# Patient Record
Sex: FEMALE | Race: WHITE | Employment: OTHER | URBAN - METROPOLITAN AREA
[De-identification: names, ages, dates, MRNs, and addresses within clinical notes are randomized per-mention and may not be internally consistent; named-entity substitution may affect disease eponyms.]

---

## 2017-02-17 ENCOUNTER — OFFICE VISIT (OUTPATIENT)
Dept: FAMILY MEDICINE CLINIC | Age: 27
End: 2017-02-17

## 2017-02-17 VITALS
DIASTOLIC BLOOD PRESSURE: 80 MMHG | SYSTOLIC BLOOD PRESSURE: 102 MMHG | HEART RATE: 70 BPM | WEIGHT: 147 LBS | OXYGEN SATURATION: 97 % | HEIGHT: 68 IN | BODY MASS INDEX: 22.28 KG/M2 | TEMPERATURE: 97.8 F

## 2017-02-17 DIAGNOSIS — R06.2 WHEEZING: Primary | ICD-10-CM

## 2017-02-17 DIAGNOSIS — J45.21 MILD INTERMITTENT ASTHMA WITH ACUTE EXACERBATION: ICD-10-CM

## 2017-02-17 PROCEDURE — 99214 OFFICE O/P EST MOD 30 MIN: CPT | Performed by: FAMILY MEDICINE

## 2017-02-17 PROCEDURE — 94640 AIRWAY INHALATION TREATMENT: CPT | Performed by: FAMILY MEDICINE

## 2017-02-17 RX ORDER — METHYLPREDNISOLONE 4 MG/1
TABLET ORAL
Qty: 1 KIT | Refills: 0 | Status: SHIPPED | OUTPATIENT
Start: 2017-02-17 | End: 2017-02-23

## 2017-02-17 RX ORDER — LEVOFLOXACIN 500 MG/1
500 TABLET, FILM COATED ORAL DAILY
Qty: 7 TABLET | Refills: 0 | Status: SHIPPED | OUTPATIENT
Start: 2017-02-17 | End: 2017-02-24

## 2017-02-17 RX ORDER — ALBUTEROL SULFATE 2.5 MG/3ML
2.5 SOLUTION RESPIRATORY (INHALATION) ONCE
Status: COMPLETED | OUTPATIENT
Start: 2017-02-17 | End: 2017-02-17

## 2017-02-17 RX ORDER — ALBUTEROL SULFATE 90 UG/1
2-4 AEROSOL, METERED RESPIRATORY (INHALATION) EVERY 6 HOURS PRN
Qty: 1 INHALER | Refills: 3 | Status: SHIPPED | OUTPATIENT
Start: 2017-02-17

## 2017-02-17 RX ADMIN — ALBUTEROL SULFATE 2.5 MG: 2.5 SOLUTION RESPIRATORY (INHALATION) at 10:55

## 2017-02-20 ASSESSMENT — ENCOUNTER SYMPTOMS
SORE THROAT: 0
WHEEZING: 1
COUGH: 1
HEARTBURN: 0
TROUBLE SWALLOWING: 0
RHINORRHEA: 1

## 2017-02-21 ENCOUNTER — TELEPHONE (OUTPATIENT)
Dept: FAMILY MEDICINE CLINIC | Age: 27
End: 2017-02-21

## 2017-04-10 ENCOUNTER — TELEPHONE (OUTPATIENT)
Dept: FAMILY MEDICINE CLINIC | Age: 27
End: 2017-04-10

## 2017-04-11 ENCOUNTER — OFFICE VISIT (OUTPATIENT)
Dept: FAMILY MEDICINE CLINIC | Age: 27
End: 2017-04-11

## 2017-04-11 VITALS
DIASTOLIC BLOOD PRESSURE: 58 MMHG | WEIGHT: 149 LBS | BODY MASS INDEX: 22.58 KG/M2 | HEART RATE: 78 BPM | OXYGEN SATURATION: 98 % | TEMPERATURE: 98 F | HEIGHT: 68 IN | SYSTOLIC BLOOD PRESSURE: 110 MMHG

## 2017-04-11 DIAGNOSIS — L70.0 ACNE VULGARIS: Primary | ICD-10-CM

## 2017-04-11 PROCEDURE — 99213 OFFICE O/P EST LOW 20 MIN: CPT | Performed by: FAMILY MEDICINE

## 2017-04-11 RX ORDER — TRETINOIN 0.5 MG/G
CREAM TOPICAL
Qty: 90 G | Refills: 2 | Status: SHIPPED | OUTPATIENT
Start: 2017-04-11 | End: 2017-05-11

## 2017-04-11 RX ORDER — CLINDAMYCIN PHOSPHATE AND BENZOYL PEROXIDE 10; 50 MG/G; MG/G
GEL TOPICAL
Qty: 90 G | Refills: 0 | Status: SHIPPED | OUTPATIENT
Start: 2017-04-11

## 2017-04-11 ASSESSMENT — ENCOUNTER SYMPTOMS: ROS SKIN COMMENTS: ACNE

## 2017-05-25 ENCOUNTER — TELEPHONE (OUTPATIENT)
Dept: FAMILY MEDICINE CLINIC | Age: 27
End: 2017-05-25

## 2017-05-25 DIAGNOSIS — L70.0 ACNE VULGARIS: ICD-10-CM

## 2017-05-25 RX ORDER — CLINDAMYCIN PHOSPHATE 10 MG/G
GEL TOPICAL
Qty: 60 G | Refills: 1 | Status: CANCELLED | OUTPATIENT
Start: 2017-05-25 | End: 2017-06-01

## 2017-06-01 RX ORDER — CLINDAMYCIN PHOSPHATE 10 MG/G
GEL TOPICAL
Qty: 60 G | Refills: 1 | Status: SHIPPED | OUTPATIENT
Start: 2017-06-01 | End: 2017-06-08

## 2017-08-15 ENCOUNTER — TELEPHONE (OUTPATIENT)
Dept: FAMILY MEDICINE CLINIC | Age: 27
End: 2017-08-15

## 2017-09-27 ENCOUNTER — TELEPHONE (OUTPATIENT)
Dept: FAMILY MEDICINE CLINIC | Age: 27
End: 2017-09-27

## 2017-09-27 NOTE — TELEPHONE ENCOUNTER
Patient called this morning and scheduled a same day appointment for a UTI. Patient just called back and cancelled stating that she is going to the [address] [place] [address]. Patient will be no showed.

## 2018-11-07 ENCOUNTER — OFFICE VISIT (OUTPATIENT)
Dept: ORTHOPEDIC SURGERY | Age: 28
End: 2018-11-07
Payer: COMMERCIAL

## 2018-11-07 VITALS — BODY MASS INDEX: 22.07 KG/M2 | HEIGHT: 69 IN | WEIGHT: 149 LBS

## 2018-11-07 DIAGNOSIS — M25.562 LEFT KNEE PAIN, UNSPECIFIED CHRONICITY: Primary | ICD-10-CM

## 2018-11-07 DIAGNOSIS — S83.232A COMPLEX TEAR OF MEDIAL MENISCUS OF LEFT KNEE AS CURRENT INJURY, INITIAL ENCOUNTER: ICD-10-CM

## 2018-11-07 PROCEDURE — 99203 OFFICE O/P NEW LOW 30 MIN: CPT | Performed by: ORTHOPAEDIC SURGERY

## 2018-11-21 ENCOUNTER — OFFICE VISIT (OUTPATIENT)
Dept: ORTHOPEDIC SURGERY | Age: 28
End: 2018-11-21
Payer: COMMERCIAL

## 2018-11-21 VITALS — HEIGHT: 69 IN | WEIGHT: 149 LBS | BODY MASS INDEX: 22.07 KG/M2

## 2018-11-21 DIAGNOSIS — S83.232A COMPLEX TEAR OF MEDIAL MENISCUS OF LEFT KNEE AS CURRENT INJURY, INITIAL ENCOUNTER: Primary | ICD-10-CM

## 2018-11-21 PROCEDURE — 99214 OFFICE O/P EST MOD 30 MIN: CPT | Performed by: ORTHOPAEDIC SURGERY

## 2018-11-26 DIAGNOSIS — S83.232A COMPLEX TEAR OF MEDIAL MENISCUS OF LEFT KNEE AS CURRENT INJURY, INITIAL ENCOUNTER: Primary | ICD-10-CM

## 2018-11-29 ENCOUNTER — TELEPHONE (OUTPATIENT)
Dept: ORTHOPEDIC SURGERY | Age: 28
End: 2018-11-29

## 2018-12-05 RX ORDER — TRETINOIN 0.25 MG/G
GEL TOPICAL NIGHTLY
COMMUNITY

## 2018-12-14 ENCOUNTER — ANESTHESIA EVENT (OUTPATIENT)
Dept: OPERATING ROOM | Age: 28
End: 2018-12-14
Payer: COMMERCIAL

## 2018-12-14 ENCOUNTER — ANESTHESIA (OUTPATIENT)
Dept: OPERATING ROOM | Age: 28
End: 2018-12-14
Payer: COMMERCIAL

## 2018-12-14 ENCOUNTER — HOSPITAL ENCOUNTER (OUTPATIENT)
Age: 28
Setting detail: OUTPATIENT SURGERY
Discharge: HOME | End: 2018-12-14
Attending: ORTHOPAEDIC SURGERY | Admitting: ORTHOPAEDIC SURGERY
Payer: COMMERCIAL

## 2018-12-14 VITALS
TEMPERATURE: 98 F | WEIGHT: 150 LBS | OXYGEN SATURATION: 98 % | RESPIRATION RATE: 17 BRPM | DIASTOLIC BLOOD PRESSURE: 69 MMHG | SYSTOLIC BLOOD PRESSURE: 109 MMHG | HEIGHT: 68 IN | HEART RATE: 70 BPM | BODY MASS INDEX: 22.73 KG/M2

## 2018-12-14 VITALS
RESPIRATION RATE: 13 BRPM | OXYGEN SATURATION: 100 % | DIASTOLIC BLOOD PRESSURE: 51 MMHG | SYSTOLIC BLOOD PRESSURE: 97 MMHG

## 2018-12-14 DIAGNOSIS — S83.239A COMPLEX TEAR OF MEDIAL MENISCUS OF KNEE AS CURRENT INJURY, UNSPECIFIED LATERALITY, INITIAL ENCOUNTER: Primary | ICD-10-CM

## 2018-12-14 DIAGNOSIS — Z98.890 S/P ARTHROSCOPY OF KNEE: ICD-10-CM

## 2018-12-14 LAB — PREGNANCY, URINE: NEGATIVE

## 2018-12-14 PROCEDURE — 6360000002 HC RX W HCPCS: Performed by: ANESTHESIOLOGY

## 2018-12-14 PROCEDURE — 7100000010 HC PHASE II RECOVERY - FIRST 15 MIN: Performed by: ORTHOPAEDIC SURGERY

## 2018-12-14 PROCEDURE — 6370000000 HC RX 637 (ALT 250 FOR IP)

## 2018-12-14 PROCEDURE — 6360000002 HC RX W HCPCS

## 2018-12-14 PROCEDURE — 84703 CHORIONIC GONADOTROPIN ASSAY: CPT

## 2018-12-14 PROCEDURE — 2580000003 HC RX 258

## 2018-12-14 PROCEDURE — 7100000000 HC PACU RECOVERY - FIRST 15 MIN: Performed by: ORTHOPAEDIC SURGERY

## 2018-12-14 PROCEDURE — 2500000003 HC RX 250 WO HCPCS: Performed by: ORTHOPAEDIC SURGERY

## 2018-12-14 PROCEDURE — 2500000003 HC RX 250 WO HCPCS

## 2018-12-14 PROCEDURE — 2500000003 HC RX 250 WO HCPCS: Performed by: ANESTHESIOLOGY

## 2018-12-14 PROCEDURE — 2580000003 HC RX 258: Performed by: ANESTHESIOLOGY

## 2018-12-14 PROCEDURE — 7100000001 HC PACU RECOVERY - ADDTL 15 MIN: Performed by: ORTHOPAEDIC SURGERY

## 2018-12-14 PROCEDURE — 7100000011 HC PHASE II RECOVERY - ADDTL 15 MIN: Performed by: ORTHOPAEDIC SURGERY

## 2018-12-14 PROCEDURE — 3600000004 HC SURGERY LEVEL 4 BASE: Performed by: ORTHOPAEDIC SURGERY

## 2018-12-14 PROCEDURE — 2709999900 HC NON-CHARGEABLE SUPPLY: Performed by: ORTHOPAEDIC SURGERY

## 2018-12-14 PROCEDURE — 3700000000 HC ANESTHESIA ATTENDED CARE: Performed by: ORTHOPAEDIC SURGERY

## 2018-12-14 PROCEDURE — 3600000014 HC SURGERY LEVEL 4 ADDTL 15MIN: Performed by: ORTHOPAEDIC SURGERY

## 2018-12-14 PROCEDURE — C1713 ANCHOR/SCREW BN/BN,TIS/BN: HCPCS | Performed by: ORTHOPAEDIC SURGERY

## 2018-12-14 PROCEDURE — 3700000001 HC ADD 15 MINUTES (ANESTHESIA): Performed by: ORTHOPAEDIC SURGERY

## 2018-12-14 PROCEDURE — 2720000010 HC SURG SUPPLY STERILE: Performed by: ORTHOPAEDIC SURGERY

## 2018-12-14 DEVICE — FAST-FIX 360 CURVED MENISCAL                                    REPAIR SYSTEM
Type: IMPLANTABLE DEVICE | Status: FUNCTIONAL
Brand: FAST-FIX

## 2018-12-14 RX ORDER — SCOLOPAMINE TRANSDERMAL SYSTEM 1 MG/1
PATCH, EXTENDED RELEASE TRANSDERMAL
Status: DISCONTINUED
Start: 2018-12-14 | End: 2018-12-14 | Stop reason: HOSPADM

## 2018-12-14 RX ORDER — ONDANSETRON 2 MG/ML
4 INJECTION INTRAMUSCULAR; INTRAVENOUS ONCE
Status: COMPLETED | OUTPATIENT
Start: 2018-12-14 | End: 2018-12-14

## 2018-12-14 RX ORDER — SCOLOPAMINE TRANSDERMAL SYSTEM 1 MG/1
1 PATCH, EXTENDED RELEASE TRANSDERMAL
Status: DISCONTINUED | OUTPATIENT
Start: 2018-12-14 | End: 2018-12-14 | Stop reason: HOSPADM

## 2018-12-14 RX ORDER — OXYCODONE HYDROCHLORIDE AND ACETAMINOPHEN 5; 325 MG/1; MG/1
1 TABLET ORAL EVERY 6 HOURS PRN
Qty: 28 TABLET | Refills: 0 | Status: SHIPPED | OUTPATIENT
Start: 2018-12-14 | End: 2018-12-21

## 2018-12-14 RX ORDER — KETOROLAC TROMETHAMINE 30 MG/ML
30 INJECTION, SOLUTION INTRAMUSCULAR; INTRAVENOUS ONCE
Status: COMPLETED | OUTPATIENT
Start: 2018-12-14 | End: 2018-12-14

## 2018-12-14 RX ORDER — OXYCODONE HYDROCHLORIDE AND ACETAMINOPHEN 5; 325 MG/1; MG/1
1 TABLET ORAL PRN
Status: DISCONTINUED | OUTPATIENT
Start: 2018-12-14 | End: 2018-12-14 | Stop reason: HOSPADM

## 2018-12-14 RX ORDER — CEFAZOLIN SODIUM 2 G/50ML
2 SOLUTION INTRAVENOUS ONCE
Status: COMPLETED | OUTPATIENT
Start: 2018-12-14 | End: 2018-12-14

## 2018-12-14 RX ORDER — BUPIVACAINE HYDROCHLORIDE 2.5 MG/ML
INJECTION, SOLUTION INFILTRATION; PERINEURAL PRN
Status: DISCONTINUED | OUTPATIENT
Start: 2018-12-14 | End: 2018-12-14 | Stop reason: HOSPADM

## 2018-12-14 RX ORDER — DEXAMETHASONE SODIUM PHOSPHATE 4 MG/ML
INJECTION, SOLUTION INTRA-ARTICULAR; INTRALESIONAL; INTRAMUSCULAR; INTRAVENOUS; SOFT TISSUE PRN
Status: DISCONTINUED | OUTPATIENT
Start: 2018-12-14 | End: 2018-12-14 | Stop reason: SDUPTHER

## 2018-12-14 RX ORDER — HYDRALAZINE HYDROCHLORIDE 20 MG/ML
5 INJECTION INTRAMUSCULAR; INTRAVENOUS
Status: DISCONTINUED | OUTPATIENT
Start: 2018-12-14 | End: 2018-12-14 | Stop reason: HOSPADM

## 2018-12-14 RX ORDER — LIDOCAINE HYDROCHLORIDE 10 MG/ML
INJECTION, SOLUTION EPIDURAL; INFILTRATION; INTRACAUDAL; PERINEURAL
Status: COMPLETED
Start: 2018-12-14 | End: 2018-12-14

## 2018-12-14 RX ORDER — LABETALOL HYDROCHLORIDE 5 MG/ML
5 INJECTION, SOLUTION INTRAVENOUS EVERY 10 MIN PRN
Status: DISCONTINUED | OUTPATIENT
Start: 2018-12-14 | End: 2018-12-14 | Stop reason: HOSPADM

## 2018-12-14 RX ORDER — ONDANSETRON 2 MG/ML
4 INJECTION INTRAMUSCULAR; INTRAVENOUS
Status: COMPLETED | OUTPATIENT
Start: 2018-12-14 | End: 2018-12-14

## 2018-12-14 RX ORDER — MEPERIDINE HYDROCHLORIDE 25 MG/ML
12.5 INJECTION INTRAMUSCULAR; INTRAVENOUS; SUBCUTANEOUS EVERY 5 MIN PRN
Status: DISCONTINUED | OUTPATIENT
Start: 2018-12-14 | End: 2018-12-14 | Stop reason: HOSPADM

## 2018-12-14 RX ORDER — BUPIVACAINE HYDROCHLORIDE 2.5 MG/ML
INJECTION, SOLUTION EPIDURAL; INFILTRATION; INTRACAUDAL
Status: DISCONTINUED
Start: 2018-12-14 | End: 2018-12-14 | Stop reason: HOSPADM

## 2018-12-14 RX ORDER — FENTANYL CITRATE 50 UG/ML
INJECTION, SOLUTION INTRAMUSCULAR; INTRAVENOUS PRN
Status: DISCONTINUED | OUTPATIENT
Start: 2018-12-14 | End: 2018-12-14 | Stop reason: SDUPTHER

## 2018-12-14 RX ORDER — MORPHINE SULFATE 10 MG/ML
2 INJECTION, SOLUTION INTRAMUSCULAR; INTRAVENOUS EVERY 5 MIN PRN
Status: DISCONTINUED | OUTPATIENT
Start: 2018-12-14 | End: 2018-12-14 | Stop reason: HOSPADM

## 2018-12-14 RX ORDER — CEFAZOLIN SODIUM 2 G/50ML
SOLUTION INTRAVENOUS
Status: COMPLETED
Start: 2018-12-14 | End: 2018-12-14

## 2018-12-14 RX ORDER — METHYLPREDNISOLONE ACETATE 80 MG/ML
INJECTION, SUSPENSION INTRA-ARTICULAR; INTRALESIONAL; INTRAMUSCULAR; SOFT TISSUE
Status: DISCONTINUED
Start: 2018-12-14 | End: 2018-12-14 | Stop reason: WASHOUT

## 2018-12-14 RX ORDER — MORPHINE SULFATE 10 MG/ML
1 INJECTION, SOLUTION INTRAMUSCULAR; INTRAVENOUS EVERY 5 MIN PRN
Status: DISCONTINUED | OUTPATIENT
Start: 2018-12-14 | End: 2018-12-14 | Stop reason: HOSPADM

## 2018-12-14 RX ORDER — PROPOFOL 10 MG/ML
INJECTION, EMULSION INTRAVENOUS PRN
Status: DISCONTINUED | OUTPATIENT
Start: 2018-12-14 | End: 2018-12-14 | Stop reason: SDUPTHER

## 2018-12-14 RX ORDER — OXYCODONE HYDROCHLORIDE 5 MG/1
5 TABLET ORAL ONCE
Status: COMPLETED | OUTPATIENT
Start: 2018-12-14 | End: 2018-12-14

## 2018-12-14 RX ORDER — SODIUM CHLORIDE, SODIUM LACTATE, POTASSIUM CHLORIDE, CALCIUM CHLORIDE 600; 310; 30; 20 MG/100ML; MG/100ML; MG/100ML; MG/100ML
INJECTION, SOLUTION INTRAVENOUS
Status: COMPLETED
Start: 2018-12-14 | End: 2018-12-14

## 2018-12-14 RX ORDER — KETOROLAC TROMETHAMINE 30 MG/ML
INJECTION, SOLUTION INTRAMUSCULAR; INTRAVENOUS
Status: DISCONTINUED
Start: 2018-12-14 | End: 2018-12-14 | Stop reason: HOSPADM

## 2018-12-14 RX ORDER — OXYCODONE HYDROCHLORIDE AND ACETAMINOPHEN 5; 325 MG/1; MG/1
2 TABLET ORAL PRN
Status: DISCONTINUED | OUTPATIENT
Start: 2018-12-14 | End: 2018-12-14 | Stop reason: HOSPADM

## 2018-12-14 RX ORDER — SODIUM CHLORIDE, SODIUM LACTATE, POTASSIUM CHLORIDE, CALCIUM CHLORIDE 600; 310; 30; 20 MG/100ML; MG/100ML; MG/100ML; MG/100ML
INJECTION, SOLUTION INTRAVENOUS CONTINUOUS
Status: DISCONTINUED | OUTPATIENT
Start: 2018-12-14 | End: 2018-12-14 | Stop reason: HOSPADM

## 2018-12-14 RX ORDER — ACETAMINOPHEN 500 MG
1000 TABLET ORAL ONCE
Status: COMPLETED | OUTPATIENT
Start: 2018-12-14 | End: 2018-12-14

## 2018-12-14 RX ORDER — ONDANSETRON 2 MG/ML
INJECTION INTRAMUSCULAR; INTRAVENOUS
Status: COMPLETED
Start: 2018-12-14 | End: 2018-12-14

## 2018-12-14 RX ORDER — LIDOCAINE HYDROCHLORIDE 20 MG/ML
INJECTION, SOLUTION INFILTRATION; PERINEURAL PRN
Status: DISCONTINUED | OUTPATIENT
Start: 2018-12-14 | End: 2018-12-14 | Stop reason: SDUPTHER

## 2018-12-14 RX ORDER — OXYCODONE HYDROCHLORIDE 5 MG/1
TABLET ORAL
Status: COMPLETED
Start: 2018-12-14 | End: 2018-12-14

## 2018-12-14 RX ORDER — ACETAMINOPHEN 500 MG
TABLET ORAL
Status: COMPLETED
Start: 2018-12-14 | End: 2018-12-14

## 2018-12-14 RX ADMIN — CEFAZOLIN SODIUM 2 G: 2 SOLUTION INTRAVENOUS at 09:13

## 2018-12-14 RX ADMIN — SODIUM CHLORIDE, POTASSIUM CHLORIDE, SODIUM LACTATE AND CALCIUM CHLORIDE: 600; 310; 30; 20 INJECTION, SOLUTION INTRAVENOUS at 08:05

## 2018-12-14 RX ADMIN — SODIUM CHLORIDE, POTASSIUM CHLORIDE, SODIUM LACTATE AND CALCIUM CHLORIDE: 600; 310; 30; 20 INJECTION, SOLUTION INTRAVENOUS at 09:44

## 2018-12-14 RX ADMIN — LIDOCAINE HYDROCHLORIDE 20 MG: 20 INJECTION, SOLUTION INFILTRATION; PERINEURAL at 09:20

## 2018-12-14 RX ADMIN — KETOROLAC TROMETHAMINE 30 MG: 30 INJECTION, SOLUTION INTRAMUSCULAR; INTRAVENOUS at 10:50

## 2018-12-14 RX ADMIN — PROPOFOL 200 MG: 10 INJECTION, EMULSION INTRAVENOUS at 09:20

## 2018-12-14 RX ADMIN — FENTANYL CITRATE 25 MCG: 50 INJECTION INTRAMUSCULAR; INTRAVENOUS at 09:43

## 2018-12-14 RX ADMIN — OXYCODONE HYDROCHLORIDE 5 MG: 5 TABLET ORAL at 11:21

## 2018-12-14 RX ADMIN — ONDANSETRON 4 MG: 2 INJECTION, SOLUTION INTRAMUSCULAR; INTRAVENOUS at 09:41

## 2018-12-14 RX ADMIN — HYDROMORPHONE HYDROCHLORIDE 0.5 MG: 1 INJECTION, SOLUTION INTRAMUSCULAR; INTRAVENOUS; SUBCUTANEOUS at 10:52

## 2018-12-14 RX ADMIN — Medication 1000 MG: at 08:03

## 2018-12-14 RX ADMIN — FENTANYL CITRATE 50 MCG: 50 INJECTION INTRAMUSCULAR; INTRAVENOUS at 09:20

## 2018-12-14 RX ADMIN — ACETAMINOPHEN 1000 MG: 500 TABLET ORAL at 08:03

## 2018-12-14 RX ADMIN — ONDANSETRON 4 MG: 2 INJECTION INTRAMUSCULAR; INTRAVENOUS at 11:58

## 2018-12-14 RX ADMIN — LIDOCAINE HYDROCHLORIDE 0.1 ML: 10 INJECTION, SOLUTION EPIDURAL; INFILTRATION; INTRACAUDAL; PERINEURAL at 08:04

## 2018-12-14 RX ADMIN — DEXAMETHASONE SODIUM PHOSPHATE 10 MG: 4 INJECTION, SOLUTION INTRAMUSCULAR; INTRAVENOUS at 09:41

## 2018-12-14 RX ADMIN — FENTANYL CITRATE 25 MCG: 50 INJECTION INTRAMUSCULAR; INTRAVENOUS at 09:41

## 2018-12-14 RX ADMIN — HYDROMORPHONE HYDROCHLORIDE 0.5 MG: 1 INJECTION, SOLUTION INTRAMUSCULAR; INTRAVENOUS; SUBCUTANEOUS at 10:59

## 2018-12-14 RX ADMIN — HYDROMORPHONE HYDROCHLORIDE 0.5 MG: 1 INJECTION, SOLUTION INTRAMUSCULAR; INTRAVENOUS; SUBCUTANEOUS at 10:41

## 2018-12-14 ASSESSMENT — PULMONARY FUNCTION TESTS
PIF_VALUE: 2
PIF_VALUE: 11
PIF_VALUE: 11
PIF_VALUE: 10
PIF_VALUE: 3
PIF_VALUE: 10
PIF_VALUE: 1
PIF_VALUE: 2
PIF_VALUE: 10
PIF_VALUE: 11
PIF_VALUE: 10
PIF_VALUE: 2
PIF_VALUE: 11
PIF_VALUE: 0
PIF_VALUE: 2
PIF_VALUE: 11
PIF_VALUE: 2
PIF_VALUE: 11
PIF_VALUE: 10
PIF_VALUE: 2
PIF_VALUE: 0
PIF_VALUE: 2
PIF_VALUE: 1
PIF_VALUE: 2
PIF_VALUE: 10
PIF_VALUE: 2
PIF_VALUE: 2
PIF_VALUE: 11
PIF_VALUE: 2
PIF_VALUE: 17
PIF_VALUE: 2
PIF_VALUE: 1
PIF_VALUE: 2
PIF_VALUE: 10
PIF_VALUE: 2
PIF_VALUE: 1
PIF_VALUE: 11
PIF_VALUE: 11
PIF_VALUE: 2
PIF_VALUE: 2
PIF_VALUE: 10
PIF_VALUE: 2
PIF_VALUE: 11
PIF_VALUE: 20
PIF_VALUE: 11
PIF_VALUE: 2
PIF_VALUE: 11
PIF_VALUE: 11
PIF_VALUE: 2
PIF_VALUE: 10
PIF_VALUE: 11
PIF_VALUE: 0
PIF_VALUE: 11
PIF_VALUE: 11
PIF_VALUE: 1
PIF_VALUE: 1
PIF_VALUE: 2
PIF_VALUE: 11
PIF_VALUE: 11
PIF_VALUE: 0
PIF_VALUE: 11

## 2018-12-14 ASSESSMENT — PAIN DESCRIPTION - LOCATION
LOCATION: KNEE

## 2018-12-14 ASSESSMENT — PAIN DESCRIPTION - ORIENTATION
ORIENTATION: LEFT

## 2018-12-14 ASSESSMENT — PAIN SCALES - GENERAL
PAINLEVEL_OUTOF10: 6
PAINLEVEL_OUTOF10: 4
PAINLEVEL_OUTOF10: 7
PAINLEVEL_OUTOF10: 6
PAINLEVEL_OUTOF10: 7

## 2018-12-14 ASSESSMENT — PAIN DESCRIPTION - PAIN TYPE
TYPE: SURGICAL PAIN

## 2018-12-14 ASSESSMENT — PAIN DESCRIPTION - DESCRIPTORS: DESCRIPTORS: BURNING;SHARP

## 2018-12-14 ASSESSMENT — PAIN - FUNCTIONAL ASSESSMENT: PAIN_FUNCTIONAL_ASSESSMENT: 0-10

## 2018-12-14 ASSESSMENT — ACTIVITIES OF DAILY LIVING (ADL): EFFECT OF PAIN ON DAILY ACTIVITIES: WALKING INCREASES PAIN

## 2018-12-14 NOTE — ANESTHESIA POSTPROCEDURE EVALUATION
Department of Anesthesiology  Postprocedure Note    Patient: Blayne Blanco  MRN: [id number]  YOB: 1990  Date of evaluation: 12/14/2018  Time:  1:08 PM     Procedure Summary     Date:  12/14/18 Room / Location:  [facility]    Anesthesia Start:  2402 Anesthesia Stop:      Procedure:  LEFT KNEE VIDEO ARTHROSCOPY, MEDIAL  MENISCUS REPAIR (Left Knee) Diagnosis:  (LEFT KNEE MEDIAL MENISCUS TEAR)    Surgeon:  Benito Quinonez MD Responsible Provider:  Yjaaira Catherine MD    Anesthesia Type:  general ASA Status:  2          Anesthesia Type: general    Daya Phase I: Daya Score: 10    Daya Phase II: Daya Score: 10    Last vitals: Reviewed and per EMR flowsheets.        Anesthesia Post Evaluation    Patient location during evaluation: PACU  Level of consciousness: awake and alert  Airway patency: patent  Nausea & Vomiting: no nausea and no vomiting  Complications: no  Cardiovascular status: blood pressure returned to baseline  Respiratory status: acceptable  Hydration status: euvolemic  Comments: Postoperative Anesthesia Note    Name:    Blayne Blanco  MRN:      [id number]    Patient Vitals in the past 12 hrs:  12/14/18 1215, Pulse:70, Resp:17, SpO2:98 %  12/14/18 1158, BP:109/69, Pulse:56, Resp:16, SpO2:97 %  12/14/18 1133, BP:117/78, Pulse:69, Resp:16, SpO2:100 %  12/14/18 1109, BP:116/78, Temp:98 °F (36.7 °C), Pulse:67, Resp:11, SpO2:96 %  12/14/18 1055, BP:113/76, Pulse:64, Resp:10, SpO2:100 %  12/14/18 1042, BP:114/76, Pulse:69, Resp:13, SpO2:100 %  12/14/18 1037, BP:109/71, Pulse:72, Resp:10, SpO2:100 %  12/14/18 1032, BP:107/72, Temp:98 °F (36.7 °C), Temp src:Temporal, Pulse:70, Resp:13, SpO2:100 %  12/14/18 0746, BP:(!) 98/56, Temp:98.5 °F (36.9 °C), Temp src:Temporal, Pulse:67, Resp:16, SpO2:100 %, Height:5' 8\" (1.727 m), Weight:150 lb (68 kg)     LABS:    CBC  Lab Results       Component                Value               Date/Time                  WBC

## 2018-12-14 NOTE — PROGRESS NOTES
Pt resting in bed, drinking water, states pain is feeling better, vitals stable, will move into phase 2

## 2018-12-17 ENCOUNTER — HOSPITAL ENCOUNTER (OUTPATIENT)
Dept: PHYSICAL THERAPY | Age: 28
Setting detail: THERAPIES SERIES
Discharge: HOME | End: 2018-12-17
Payer: COMMERCIAL

## 2018-12-17 PROCEDURE — 97140 MANUAL THERAPY 1/> REGIONS: CPT | Performed by: PHYSICAL THERAPIST

## 2018-12-17 PROCEDURE — 97161 PT EVAL LOW COMPLEX 20 MIN: CPT | Performed by: PHYSICAL THERAPIST

## 2018-12-17 PROCEDURE — 97110 THERAPEUTIC EXERCISES: CPT | Performed by: PHYSICAL THERAPIST

## 2018-12-17 PROCEDURE — G8978 MOBILITY CURRENT STATUS: HCPCS | Performed by: PHYSICAL THERAPIST

## 2018-12-17 PROCEDURE — G8979 MOBILITY GOAL STATUS: HCPCS | Performed by: PHYSICAL THERAPIST

## 2018-12-17 NOTE — OP NOTE
and taken to the  operating room for general anesthesia. A tourniquet and leg doan were  placed on the proximal left thigh. The extremity was prepped with  ChloraPrep and draped sterilely. A standard superomedial, anteromedial,  and anterolateral portals were made. The arthroscope was introduced and  a standard inspection of the joint was performed. Within the joint, the  suprapatellar pouch was normal, patellofemoral joint was also normal.   We passed down the medial gutter to the medial compartment, where the  articular cartilage again appeared to be normal.  There was an  abnormality at the posterior horn of the medial meniscus with an  irregular contour, and by palpation with a probe, we could feel the  injury within the substance of the meniscus. We then inspected the  intercondylar notch, which was normal, ACL and PCL were intact and then  the lateral compartment also was inspected and was completely normal  with a normal meniscus and normal articular cartilage. We did pass the  scope through the intercondylar notch to the posterior aspect of the  knee and examined the posterior medial meniscus. There was no exit  posteriorly either. Although we could see fraying in that region, and  again palpate instability. A posterior medial portal was made, and we  were able to palpate the meniscus from the posterior aspect. We could  easily feel the defect within the meniscus. Therefore, through that  posterior portal using a suture lasso technique, a suture was passed  through the periphery of the meniscus, and using a 0-fiber wire suture,  we were able to repair the posterior aspect of the meniscus and  basically sewing it meniscus to meniscal ligament. At that point, we  placed the scope back into the medial compartment from an anterior  aspect and placed standard Smith and Nephew FasT-Fix sutures across the  meniscus and ended up placing 3 sutures in that fashion as well.   This  significantly

## 2018-12-19 NOTE — PLAN OF CARE
The [facility], [address] [facility], [address]  Phone: [phone]   Fax:     [phone]    Physical Therapy Certification    Dear dr Gonzalo Lockett ,    We had the pleasure of evaluating the following patient for physical therapy services at [address] [place] [address]. A summary of our findings can be found in the initial assessment below. This includes our plan of care. If you have any questions or concerns regarding these findings, please do not hesitate to contact me at the office phone number checked above. Thank you for the referral.       Physician Signature:_______________________________Date:__________________  By signing above (or electronic signature), therapists plan is approved by physician      Patient: Cristiano Garcia   : 1990   MRN: [id number]  Referring Physician:        Evaluation Date: 2018      Medical Diagnosis Information:  Diagnosis: G35.229J (ICD-10-CM) - Complex tear of medial meniscus of left knee as current injury, initial encounter   Treatment Diagnosis: left knee pain - m25.562                                         Insurance information: PT Insurance Information: INSURANCE CO: [employer]     Precautions/ Contra-indications:    Latex Allergy:  NO      YES  Preferred Language for Healthcare:   English       other:    SUBJECTIVE: Patient stated complaint: \"tore my mensicus. They were able to repair it\"     Relevant Medical History: none  Functional Disability Index: 88%   Pain Scale: 7/10  Easing factors: rest  Provocative factors: Activity/wbing    Night Pain:  - denied pain at night  - complained of night pain associated with sleep position. - complained of waking night pain not associated with position.      Type:   - Constant     - Intermittent      Paresthesia complaints:   - no paresthesia complaints   - Paresthesia complaints in a distribution. Functional Limitations/Impairments:   - Standing   - Walking      - Squatting/bending    - Stairs             - ADL's   - Sports/Recreation         Occupation/School:   - retired    - domestic    - labor intensive occupation   - sedentary occupation   - high school student    - college student   - unemployed   - on disability   - other    Living Status/Prior Level of Function: This patient was independent in ADL's and IADL's prior to onset of symptoms. 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 Sport/recreational activities:   - golf   - tennis   - basketball   - resistive training   - cardiovascular training   - bowling      PACEMAKER:  - Denied having a pacemaker that would contraindicate the use of electrical modalities. - Has a pacemaker that would make electrical modalities contraindicated. METAL IMPLANTS:  - Denied metal implants that would contraindicate the use of thermal modalities. - Has a metal implant in the treatment area that would make the use of thermal agents contraindicated. CANCER HISTORY:  - Denied a history of cancer that would contraindicate thermal modalities. - Has a history of cancer that would make the use of thermal modalities contraindicated. OBJECTIVE:        Joint mobility:   Normal   Hypo  Hyper    Palpation:     Functional Mobility/Transfers:     Posture:     Circumferential Measures: +1.25cm knee asymmetry     ROM: -3 to 60 degrees     Strength/Neuromuscular control:    Bandages/Dressings/Incisions:     Gait: tt wb     Dermatomal Sensation:  - Dermatomal sensation was intact to light touch bilaterally throughout upper and lower extremities. - Dermatomal sensation loss was found in the following dermatomal pattern. L1  L2 L3  L4 L5  S1   C1 C2 C3 C4C5 C6 C7 C8 T1   Deep tendon reflexes:  - DTR's were symmetrical and intact throughout.   - 0 - (absent/areflexia)    - 1 - (diminished/hyporeflexia)  - 2 - (average/normal)  - 3 -

## 2018-12-24 ENCOUNTER — HOSPITAL ENCOUNTER (OUTPATIENT)
Dept: PHYSICAL THERAPY | Age: 28
Setting detail: THERAPIES SERIES
Discharge: HOME | End: 2018-12-24
Payer: COMMERCIAL

## 2018-12-24 PROCEDURE — 97140 MANUAL THERAPY 1/> REGIONS: CPT | Performed by: PHYSICAL THERAPIST

## 2018-12-24 PROCEDURE — 97112 NEUROMUSCULAR REEDUCATION: CPT | Performed by: PHYSICAL THERAPIST

## 2018-12-24 PROCEDURE — 97110 THERAPEUTIC EXERCISES: CPT | Performed by: PHYSICAL THERAPIST

## 2018-12-26 ENCOUNTER — HOSPITAL ENCOUNTER (OUTPATIENT)
Dept: PHYSICAL THERAPY | Age: 28
Setting detail: THERAPIES SERIES
Discharge: HOME | End: 2018-12-26
Payer: COMMERCIAL

## 2018-12-26 PROCEDURE — 97140 MANUAL THERAPY 1/> REGIONS: CPT | Performed by: PHYSICAL THERAPIST

## 2018-12-26 PROCEDURE — 97112 NEUROMUSCULAR REEDUCATION: CPT | Performed by: PHYSICAL THERAPIST

## 2018-12-26 PROCEDURE — 97110 THERAPEUTIC EXERCISES: CPT | Performed by: PHYSICAL THERAPIST

## 2018-12-27 NOTE — FLOWSHEET NOTE
allowing for proper ROM for normal function with self care, mobility, lifting and ambulation. Modalities:    Cp  Charges:  Timed Code Treatment Minutes: 64'   Total Treatment Minutes: 79'      [] EVAL  [x] GF(09542) x  2   [] IONTO  [x] NMR (30826) x  1   [] VASO  [x] Manual (10401) x  1    [] Other:  [] TA x       [] Mech Traction (20362)  [] ES(attended) (05335)      [] ES (un) (48858):     GOALS:   Long Term Goals: To be achieved in: 8 weeks  - The patient is expected to demonstrate less than  36% impairment, limitation or restriction in:  - walking and moving around (mobility)  -changing and maintaining body position  - carrying, moving and handling objects. - Patient will demonstrate increased passive and active ROM to full, symmetrical and painfree to allow for proper joint functioning as indicated by patients Functional Deficits. - Patient will demonstrate an increase in Strength to full and painfree to resistance testing to allow for proper functional mobility as indicated by patients Functional Deficits. - Patient will return to desired, higher level,  functional activities without increased symptoms or restriction. Progression Towards Functional goals:  [] Patient is progressing as expected towards functional goals listed. [] Progression is slowed due to complexities listed. [] Progression has been slowed due to co-morbidities.   [x] Plan just implemented, too soon to assess goals progression  [] Other:     ASSESSMENT:  See eval    Treatment/Activity Tolerance:  [] Patient tolerated treatment well [] Patient limited by fatique  [] Patient limited by pain  [] Patient limited by other medical complications  [] Other:     Prognosis: [] Good [] Fair  [] Poor    Patient Requires Follow-up: [x] Yes  [] No    PLAN: See eval  [x] Continue per plan of care [] Alter current plan (see comments)  [] Plan of care initiated [] Hold pending MD visit [] Discharge    Electronically signed by:

## 2018-12-28 ENCOUNTER — OFFICE VISIT (OUTPATIENT)
Dept: ORTHOPEDIC SURGERY | Age: 28
End: 2018-12-28

## 2018-12-28 VITALS
BODY MASS INDEX: 22.72 KG/M2 | SYSTOLIC BLOOD PRESSURE: 120 MMHG | HEART RATE: 70 BPM | DIASTOLIC BLOOD PRESSURE: 72 MMHG | WEIGHT: 149.91 LBS | HEIGHT: 68 IN

## 2018-12-28 DIAGNOSIS — S83.232A COMPLEX TEAR OF MEDIAL MENISCUS OF LEFT KNEE AS CURRENT INJURY, INITIAL ENCOUNTER: Primary | ICD-10-CM

## 2018-12-28 PROCEDURE — 99024 POSTOP FOLLOW-UP VISIT: CPT | Performed by: PHYSICIAN ASSISTANT

## 2018-12-28 NOTE — OP NOTE
The [facility], [address] [facility], [address]  Phone: [phone]   Fax:     [phone]        Date: 12/27/2018  Physician: Christopher holden Patient: Leroy Thacker Diagnosis: s/p left knee pmr    Patient has received 4 sessions of Physical Therapy    Specific Functional Improvement and Impression:  Evens Machado is doing very well.  5 degrees hyperext to 90 degrees flexion. 4/5 quad. Thanks             Summary:   [x] Patient is progressing as expected for this condition   [] Patient is progressing, but slower than expected for this condition   [] Patient is not progressing  Clinician Recommendations:   [x] Continue rehabilitation due to objective improvement and continued functional deficits. [] Follow up periodically to advance home exercise program to match level of function. [] Continue rehabitation due to objective improvement and continued functional deficits with progression to work conditioning. [] Discharge to post-rehab program secondary to maximizing \"medical necessity\" of physical / occupational therapy.    [] Discharge independent in a home program as:  [] All goals achieved  [] Maximized \"medical necessity\" of physical / occupational therapy  [] No subjective or objective improvements    Plan: continue   Electronically signed by: Laverna Phalen, PT, lMPT    License #: [id number]   Physician Recommendations:  [] Follow treatment plan as above [] Discontinue physical therapy  [] Change plan to: _______________________________________________________________

## 2019-01-02 ENCOUNTER — HOSPITAL ENCOUNTER (OUTPATIENT)
Dept: PHYSICAL THERAPY | Age: 29
Setting detail: THERAPIES SERIES
Discharge: HOME | End: 2019-01-02
Payer: COMMERCIAL

## 2019-01-02 PROCEDURE — 97140 MANUAL THERAPY 1/> REGIONS: CPT | Performed by: PHYSICAL THERAPIST

## 2019-01-02 PROCEDURE — 97110 THERAPEUTIC EXERCISES: CPT | Performed by: PHYSICAL THERAPIST

## 2019-01-07 ENCOUNTER — HOSPITAL ENCOUNTER (OUTPATIENT)
Dept: PHYSICAL THERAPY | Age: 29
Setting detail: THERAPIES SERIES
Discharge: HOME | End: 2019-01-07
Payer: COMMERCIAL

## 2019-01-07 PROCEDURE — 97110 THERAPEUTIC EXERCISES: CPT | Performed by: PHYSICAL THERAPIST

## 2019-01-07 PROCEDURE — 97140 MANUAL THERAPY 1/> REGIONS: CPT | Performed by: PHYSICAL THERAPIST

## 2019-01-10 ENCOUNTER — HOSPITAL ENCOUNTER (OUTPATIENT)
Dept: PHYSICAL THERAPY | Age: 29
Setting detail: THERAPIES SERIES
Discharge: HOME | End: 2019-01-10
Payer: COMMERCIAL

## 2019-01-10 PROCEDURE — 97110 THERAPEUTIC EXERCISES: CPT | Performed by: PHYSICAL THERAPIST

## 2019-01-10 PROCEDURE — 97140 MANUAL THERAPY 1/> REGIONS: CPT | Performed by: PHYSICAL THERAPIST

## 2019-01-14 ENCOUNTER — HOSPITAL ENCOUNTER (OUTPATIENT)
Dept: PHYSICAL THERAPY | Age: 29
Setting detail: THERAPIES SERIES
Discharge: HOME | End: 2019-01-14
Payer: COMMERCIAL

## 2019-01-14 PROCEDURE — 97110 THERAPEUTIC EXERCISES: CPT | Performed by: PHYSICAL THERAPIST

## 2019-01-17 ENCOUNTER — OFFICE VISIT (OUTPATIENT)
Dept: ORTHOPEDIC SURGERY | Age: 29
End: 2019-01-17

## 2019-01-17 ENCOUNTER — HOSPITAL ENCOUNTER (OUTPATIENT)
Dept: PHYSICAL THERAPY | Age: 29
Setting detail: THERAPIES SERIES
Discharge: HOME | End: 2019-01-17
Payer: COMMERCIAL

## 2019-01-17 DIAGNOSIS — S83.232A COMPLEX TEAR OF MEDIAL MENISCUS OF LEFT KNEE AS CURRENT INJURY, INITIAL ENCOUNTER: Primary | ICD-10-CM

## 2019-01-17 PROCEDURE — 97110 THERAPEUTIC EXERCISES: CPT | Performed by: PHYSICAL THERAPIST

## 2019-01-17 PROCEDURE — 99024 POSTOP FOLLOW-UP VISIT: CPT | Performed by: PHYSICIAN ASSISTANT

## 2019-01-24 ENCOUNTER — HOSPITAL ENCOUNTER (OUTPATIENT)
Dept: PHYSICAL THERAPY | Age: 29
Setting detail: THERAPIES SERIES
Discharge: HOME | End: 2019-01-24
Payer: COMMERCIAL

## 2019-01-24 PROCEDURE — 97140 MANUAL THERAPY 1/> REGIONS: CPT | Performed by: PHYSICAL THERAPIST

## 2019-01-24 PROCEDURE — 97110 THERAPEUTIC EXERCISES: CPT | Performed by: PHYSICAL THERAPIST

## 2019-01-28 ENCOUNTER — HOSPITAL ENCOUNTER (OUTPATIENT)
Dept: PHYSICAL THERAPY | Age: 29
Setting detail: THERAPIES SERIES
Discharge: HOME | End: 2019-01-28
Payer: COMMERCIAL

## 2019-01-28 PROCEDURE — 97140 MANUAL THERAPY 1/> REGIONS: CPT | Performed by: PHYSICAL THERAPIST

## 2019-01-28 PROCEDURE — 97016 VASOPNEUMATIC DEVICE THERAPY: CPT | Performed by: PHYSICAL THERAPIST

## 2019-01-28 PROCEDURE — 97110 THERAPEUTIC EXERCISES: CPT | Performed by: PHYSICAL THERAPIST

## 2019-01-31 ENCOUNTER — HOSPITAL ENCOUNTER (OUTPATIENT)
Dept: PHYSICAL THERAPY | Age: 29
Setting detail: THERAPIES SERIES
Discharge: HOME | End: 2019-01-31
Payer: COMMERCIAL

## 2019-01-31 PROCEDURE — 97140 MANUAL THERAPY 1/> REGIONS: CPT | Performed by: PHYSICAL THERAPIST

## 2019-01-31 PROCEDURE — 97110 THERAPEUTIC EXERCISES: CPT | Performed by: PHYSICAL THERAPIST

## 2019-02-04 ENCOUNTER — HOSPITAL ENCOUNTER (OUTPATIENT)
Dept: PHYSICAL THERAPY | Age: 29
Setting detail: THERAPIES SERIES
Discharge: HOME | End: 2019-02-04
Payer: COMMERCIAL

## 2019-02-04 PROCEDURE — 97110 THERAPEUTIC EXERCISES: CPT | Performed by: PHYSICAL THERAPIST

## 2019-02-04 PROCEDURE — 97140 MANUAL THERAPY 1/> REGIONS: CPT | Performed by: PHYSICAL THERAPIST

## 2019-02-06 ENCOUNTER — OFFICE VISIT (OUTPATIENT)
Dept: ORTHOPEDIC SURGERY | Age: 29
End: 2019-02-06

## 2019-02-06 VITALS — HEIGHT: 67 IN | BODY MASS INDEX: 23.39 KG/M2 | WEIGHT: 149 LBS

## 2019-02-06 DIAGNOSIS — S83.232A COMPLEX TEAR OF MEDIAL MENISCUS OF LEFT KNEE AS CURRENT INJURY, INITIAL ENCOUNTER: Primary | ICD-10-CM

## 2019-02-06 PROCEDURE — 99024 POSTOP FOLLOW-UP VISIT: CPT | Performed by: ORTHOPAEDIC SURGERY

## 2019-02-07 ENCOUNTER — HOSPITAL ENCOUNTER (OUTPATIENT)
Dept: PHYSICAL THERAPY | Age: 29
Setting detail: THERAPIES SERIES
Discharge: HOME | End: 2019-02-07
Payer: COMMERCIAL

## 2019-02-07 PROCEDURE — 97140 MANUAL THERAPY 1/> REGIONS: CPT | Performed by: PHYSICAL THERAPIST

## 2019-02-07 PROCEDURE — 97110 THERAPEUTIC EXERCISES: CPT | Performed by: PHYSICAL THERAPIST

## 2019-02-07 PROCEDURE — G8979 MOBILITY GOAL STATUS: HCPCS | Performed by: PHYSICAL THERAPIST

## 2019-02-07 PROCEDURE — G8978 MOBILITY CURRENT STATUS: HCPCS | Performed by: PHYSICAL THERAPIST

## 2019-02-11 ENCOUNTER — HOSPITAL ENCOUNTER (OUTPATIENT)
Dept: PHYSICAL THERAPY | Age: 29
Setting detail: THERAPIES SERIES
Discharge: HOME | End: 2019-02-11
Payer: COMMERCIAL

## 2019-02-11 PROCEDURE — 97110 THERAPEUTIC EXERCISES: CPT | Performed by: PHYSICAL THERAPIST

## 2019-02-11 PROCEDURE — 97140 MANUAL THERAPY 1/> REGIONS: CPT | Performed by: PHYSICAL THERAPIST

## 2019-02-14 ENCOUNTER — HOSPITAL ENCOUNTER (OUTPATIENT)
Dept: PHYSICAL THERAPY | Age: 29
Setting detail: THERAPIES SERIES
Discharge: HOME | End: 2019-02-14
Payer: COMMERCIAL

## 2019-02-14 PROCEDURE — 97110 THERAPEUTIC EXERCISES: CPT | Performed by: PHYSICAL THERAPIST

## 2019-02-14 PROCEDURE — 97140 MANUAL THERAPY 1/> REGIONS: CPT | Performed by: PHYSICAL THERAPIST

## 2019-02-18 ENCOUNTER — HOSPITAL ENCOUNTER (OUTPATIENT)
Dept: PHYSICAL THERAPY | Age: 29
Setting detail: THERAPIES SERIES
Discharge: HOME | End: 2019-02-18
Payer: COMMERCIAL

## 2019-02-18 PROCEDURE — 97150 GROUP THERAPEUTIC PROCEDURES: CPT | Performed by: PHYSICAL THERAPIST

## 2019-02-18 PROCEDURE — 97110 THERAPEUTIC EXERCISES: CPT | Performed by: PHYSICAL THERAPIST

## 2019-02-18 PROCEDURE — 97112 NEUROMUSCULAR REEDUCATION: CPT | Performed by: PHYSICAL THERAPIST

## 2019-02-18 PROCEDURE — 97140 MANUAL THERAPY 1/> REGIONS: CPT | Performed by: PHYSICAL THERAPIST

## 2019-03-04 ENCOUNTER — HOSPITAL ENCOUNTER (OUTPATIENT)
Dept: PHYSICAL THERAPY | Age: 29
Setting detail: THERAPIES SERIES
Discharge: HOME | End: 2019-03-04
Payer: COMMERCIAL

## 2019-03-04 PROCEDURE — 97110 THERAPEUTIC EXERCISES: CPT | Performed by: PHYSICAL THERAPIST

## 2019-03-04 PROCEDURE — 97150 GROUP THERAPEUTIC PROCEDURES: CPT | Performed by: PHYSICAL THERAPIST

## 2019-03-04 PROCEDURE — 97140 MANUAL THERAPY 1/> REGIONS: CPT | Performed by: PHYSICAL THERAPIST

## 2019-03-06 ENCOUNTER — OFFICE VISIT (OUTPATIENT)
Dept: ORTHOPEDIC SURGERY | Age: 29
End: 2019-03-06

## 2019-03-06 VITALS — HEIGHT: 67 IN | BODY MASS INDEX: 23.39 KG/M2 | WEIGHT: 149 LBS

## 2019-03-06 DIAGNOSIS — S83.232A COMPLEX TEAR OF MEDIAL MENISCUS OF LEFT KNEE AS CURRENT INJURY, INITIAL ENCOUNTER: Primary | ICD-10-CM

## 2019-03-06 PROCEDURE — 99024 POSTOP FOLLOW-UP VISIT: CPT | Performed by: ORTHOPAEDIC SURGERY

## 2019-04-17 ENCOUNTER — OFFICE VISIT (OUTPATIENT)
Dept: ORTHOPEDIC SURGERY | Age: 29
End: 2019-04-17
Payer: COMMERCIAL

## 2019-04-17 VITALS — BODY MASS INDEX: 23.39 KG/M2 | HEIGHT: 67 IN | WEIGHT: 149 LBS

## 2019-04-17 DIAGNOSIS — S83.232A COMPLEX TEAR OF MEDIAL MENISCUS OF LEFT KNEE AS CURRENT INJURY, INITIAL ENCOUNTER: Primary | ICD-10-CM

## 2019-04-17 PROCEDURE — 99213 OFFICE O/P EST LOW 20 MIN: CPT | Performed by: ORTHOPAEDIC SURGERY

## 2019-05-29 ENCOUNTER — HOSPITAL ENCOUNTER (EMERGENCY)
Age: 29
Discharge: HOME | End: 2019-05-29
Attending: EMERGENCY MEDICINE
Payer: COMMERCIAL

## 2019-05-29 VITALS
TEMPERATURE: 98.3 F | RESPIRATION RATE: 16 BRPM | HEART RATE: 61 BPM | HEIGHT: 68 IN | BODY MASS INDEX: 24.04 KG/M2 | SYSTOLIC BLOOD PRESSURE: 119 MMHG | DIASTOLIC BLOOD PRESSURE: 74 MMHG | OXYGEN SATURATION: 99 % | WEIGHT: 158.6 LBS

## 2019-05-29 DIAGNOSIS — S81.811A LACERATION OF RIGHT LOWER LEG, INITIAL ENCOUNTER: Primary | ICD-10-CM

## 2019-05-29 DIAGNOSIS — Z23 NEED FOR TETANUS BOOSTER: ICD-10-CM
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
## 2019-05-29 PROCEDURE — 90471 IMMUNIZATION ADMIN: CPT | Performed by: EMERGENCY MEDICINE

## 2019-05-29 PROCEDURE — 4500000022 HC ED LEVEL 2 PROCEDURE

## 2019-05-29 PROCEDURE — 99282 EMERGENCY DEPT VISIT SF MDM: CPT

## 2019-05-29 PROCEDURE — 90715 TDAP VACCINE 7 YRS/> IM: CPT | Performed by: EMERGENCY MEDICINE

## 2019-05-29 PROCEDURE — 6360000002 HC RX W HCPCS: Performed by: EMERGENCY MEDICINE

## 2019-05-29 RX ADMIN — TETANUS TOXOID, REDUCED DIPHTHERIA TOXOID AND ACELLULAR PERTUSSIS VACCINE, ADSORBED 0.5 ML: 5; 2.5; 8; 8; 2.5 SUSPENSION INTRAMUSCULAR at 22:53

## 2019-05-29 ASSESSMENT — PAIN DESCRIPTION - FREQUENCY: FREQUENCY: INTERMITTENT

## 2019-05-29 ASSESSMENT — PAIN SCALES - GENERAL: PAINLEVEL_OUTOF10: 5

## 2019-05-29 ASSESSMENT — PAIN DESCRIPTION - PAIN TYPE: TYPE: ACUTE PAIN

## 2019-05-30 NOTE — ED PROVIDER NOTES
CHIEF COMPLAINT  Laceration (rt lat leg, was cutting grass and something flew out of mower striking her leg. )      HISTORY OF PRESENT ILLNESS  Suni Chavez is a 30 y.o. female presents to the ED with laceration to her right lateral leg, occurring just prior to arrival, was mowing the lawn and something flew out from under the motor and hit her in the leg, bleeding controlled, no tetanus booster since 2011, no history of diabetes or poor wound healing. No other complaints, modifying factors or associated symptoms. I have reviewed the following from the nursing documentation.     Past Medical History:   Diagnosis Date    Asthma     Cervical dysplasia or atypia, mild 2010    BRIAN (generalised anxiety disorder)     IC (intermittent claudication) (HCC)     Interstitial cystitis     Dr Ranjana ASTUDILLO (postoperative nausea and vomiting)      Past Surgical History:   Procedure Laterality Date    ARTHROSCOPY / ARTHROTOMY KNEE Left 12/14/2018    LEFT KNEE VIDEO ARTHROSCOPY, MEDIAL  MENISCUS REPAIR performed by Jaci Jhaveri MD at [id number] [facility]       Family History   Problem Relation Age of Onset    Cancer Father         pancreatic -2010    Cancer Paternal Grandmother         lung Cancer     Social History     Socioeconomic History    Marital status:      Spouse name: Not on file    Number of children: Not on file    Years of education: Not on file    Highest education level: Not on file   Occupational History    Not on file   Social Needs    Financial resource strain: Not on file    Food insecurity:     Worry: Not on file     Inability: Not on file    Transportation needs:     Medical: Not on file     Non-medical: Not on file   Tobacco Use    Smoking status: Never Smoker    Smokeless tobacco: Never Used   Substance and Sexual Activity    Alcohol use: No    Drug use: No    Sexual activity: Yes     Partners: Male   Lifestyle    Physical activity:     Days per week: Not on file     Minutes per session: Not on file    Stress: Not on file   Relationships    Social connections:     Talks on phone: Not on file     Gets together: Not on file     Attends Shinto service: Not on file     Active member of club or organization: Not on file     Attends meetings of clubs or organizations: Not on file     Relationship status: Not on file    Intimate partner violence:     Fear of current or ex partner: Not on file     Emotionally abused: Not on file     Physically abused: Not on file     Forced sexual activity: Not on file   Other Topics Concern    Not on file   Social History Narrative    Not on file     No current facility-administered medications for this encounter. Current Outpatient Medications   Medication Sig Dispense Refill    tretinoin (RETIN-A) 0.025 % gel Apply topically nightly Twice a week      Clindamycin-Benzoyl Per, Refr, 1.2-5 % GEL Use as directed twice daily on  skin 90 g 0    albuterol sulfate HFA (PROAIR HFA) 108 (90 BASE) MCG/ACT inhaler Inhale 2-4 puffs into the lungs every 6 hours as needed for Wheezing May switch to either Ventolin or Proventil; whichever is covered 1 Inhaler 3    budesonide-formoterol (SYMBICORT) 160-4.5 MCG/ACT AERO Inhale 2 puffs into the lungs 2 times daily 1 Inhaler 3     Allergies   Allergen Reactions    Vicodin [Hydrocodone-Acetaminophen] Rash     Tolerates acetaminophen        REVIEW OF SYSTEMS  10 systems reviewed, pertinent positives per HPI otherwise noted to be negative. PHYSICAL EXAM  /74   Pulse 61   Temp 98.3 °F (36.8 °C) (Oral)   Resp 16   Ht 5' 8\" (1.727 m)   Wt 71.9 kg (158 lb 9.6 oz)   SpO2 99%   BMI 24.12 kg/m²   GENERAL APPEARANCE: Awake and alert. Cooperative ,no acute distress  HEAD: Normocephalic. Atraumatic. EYES: PERRL. EOM's grossly intact. ENT: Mucous membranes are moist.   NECK: Supple. HEART: RRR. No murmurs  LUNGS: Respirations nonlabored.  Lungs are clear to auscultation bilaterally   EXTREMITIES: No peripheral edema. Moves all extremities equally. All extremities neurovascularly intact. 2+ DP and PT pulse on the right, and distal sensation intact, able to ambulate  SKIN: Warm and dry. No acute rashes. See lac repair note below- approximately 7 cm superficial laceration with no active bleeding, vertical crescent-shaped, contusion/ecchymosis of the area approximately 3 cm x 3 cm  NEUROLOGICAL: Alert and oriented. No gross facial drooping. Normal speech   PSYCHIATRIC: Normal mood and affect. Patient Name: [facility]     Room/Bed: 07/07  Laceration Repair Procedure Note  Indication: Laceration right mid lateral leg    Procedure: The patient was placed in the appropriate position and anesthesia around the laceration was not performed at the patient's request. The area was then cleansed with Hibiclens and draped in a sterile fashion. The laceration was closed with Dermabond. There were no additional lacerations requiring repair. The wound area was then dressed with gauze and tape. Total repaired wound length: 7 cm. Other Items: None    The patient tolerated the procedure well. Complications: None      ED COURSE/MDM  Patient seen and evaluated. Old records reviewed. Labs and imaging reviewed and results discussed with patient.    Orders Placed This Encounter   Medications    DISCONTD: Tdap (ADACEL) injection 0.5 mL    Tetanus-Diphth-Acell Pertussis (BOOSTRIX) injection 0.5 mL      30year-old female status post injury from something coming up and hitting her left leg while mowing, updated tetanus, after laceration was cleaned- it was deemed to be appropriate for Dermabond repair, no gaping wound, only superficial laceration, bleeding controlled, no diabetes or concern for poor healing, discussed wound care, Strict return precautions given, will return if any worsening symptoms or new concerns, patient verbalized understanding of plan, felt comfortable going home. CLINICAL IMPRESSION  1. Laceration of right lower leg, initial encounter    2. Accident caused by powered , initial encounter    3. Need for tetanus booster        Blood pressure 119/74, pulse 61, temperature 98.3 °F (36.8 °C), temperature source Oral, resp. rate 16, height 5' 8\" (1.727 m), weight 71.9 kg (158 lb 9.6 oz), SpO2 99 %, not currently breastfeeding. Tete Forte was discharged to home in stable condition.                    Yobani Call,   05/30/19 3788

## 2019-05-30 NOTE — ED NOTES
Pt dc/d with instructions in stable condition, ambulatory to lobby. Home per ride.       Damon Arguello RN  05/29/19 7450

## 2019-11-13 ENCOUNTER — HOSPITAL ENCOUNTER (OUTPATIENT)
Dept: NUCLEAR MEDICINE | Age: 29
Discharge: HOME | End: 2019-11-13
Payer: COMMERCIAL

## 2019-11-13 VITALS — WEIGHT: 158 LBS | HEIGHT: 68 IN | BODY MASS INDEX: 23.95 KG/M2

## 2019-11-13 DIAGNOSIS — R10.11 ABDOMINAL PAIN, RIGHT UPPER QUADRANT: ICD-10-CM

## 2019-11-13 PROCEDURE — A9537 TC99M MEBROFENIN: HCPCS | Performed by: INTERNAL MEDICINE

## 2019-11-13 PROCEDURE — 6360000004 HC RX CONTRAST MEDICATION: Performed by: INTERNAL MEDICINE

## 2019-11-13 PROCEDURE — 3430000000 HC RX DIAGNOSTIC RADIOPHARMACEUTICAL: Performed by: INTERNAL MEDICINE

## 2019-11-13 PROCEDURE — 2580000003 HC RX 258: Performed by: INTERNAL MEDICINE

## 2019-11-13 PROCEDURE — 78227 HEPATOBIL SYST IMAGE W/DRUG: CPT

## 2019-11-13 RX ADMIN — SODIUM CHLORIDE 1.43 MCG: 9 INJECTION, SOLUTION INTRAVENOUS at 10:31

## 2019-11-13 RX ADMIN — Medication 6 MILLICURIE: at 08:53

## 2019-12-09 ENCOUNTER — OFFICE VISIT (OUTPATIENT)
Dept: ORTHOPEDIC SURGERY | Age: 29
End: 2019-12-09
Payer: COMMERCIAL

## 2019-12-09 VITALS — WEIGHT: 149 LBS | HEIGHT: 67 IN | BODY MASS INDEX: 23.39 KG/M2

## 2019-12-09 DIAGNOSIS — M25.561 RIGHT KNEE PAIN, UNSPECIFIED CHRONICITY: Primary | ICD-10-CM

## 2019-12-09 PROCEDURE — 99214 OFFICE O/P EST MOD 30 MIN: CPT | Performed by: ORTHOPAEDIC SURGERY

## 2019-12-16 DIAGNOSIS — M25.512 LEFT SHOULDER PAIN, UNSPECIFIED CHRONICITY: Primary | ICD-10-CM

## 2020-08-17 ENCOUNTER — OFFICE VISIT (OUTPATIENT)
Dept: ORTHOPEDIC SURGERY | Age: 30
End: 2020-08-17
Payer: COMMERCIAL

## 2020-08-17 VITALS — WEIGHT: 149 LBS | BODY MASS INDEX: 23.39 KG/M2 | HEIGHT: 67 IN

## 2020-08-17 PROCEDURE — 99213 OFFICE O/P EST LOW 20 MIN: CPT | Performed by: PHYSICIAN ASSISTANT

## 2020-08-17 RX ORDER — MELOXICAM 15 MG/1
15 TABLET ORAL DAILY
Qty: 30 TABLET | Refills: 2 | Status: SHIPPED | OUTPATIENT
Start: 2020-08-17

## 2020-08-17 NOTE — PROGRESS NOTES
MD Claudio Mcclendon [place] [place]         Orthopaedic Surgery and Sports Medicine      Patient Name: Luci Jackson  YOB: 1990  Patient's PCP is Yohana Rios MD    SUBJECTIVE  Chief Complaint:  Shoulder Pain (LEFT   MRI RESULTS)      History of Present Illness:  Luci Jackson is a right handed 26 y.o. female here regarding left shoulder pain. The pain began approx 7months ago, but worsened approx 6wks ago. There was not a history of injury. Location: in the anterior glenohumeral region  Quality: aching and sharp   Pain Scale: 3/10  Context: overall course is worsening   Alleviating Factors: rest  Exacerbating Factors: activity, weight-lifting and certain exercises  Associated Symptoms: popping    Sleep pattern is affected by the chief complaint: No  The patient has not had PT. The patient has not had an injection. The patient has taken NSAIDs, ibuprofen as needed. The patient is working. Patient denies any previous problems with her shoulder. She does report numbness in her hands when sleeping. She reports this is been going on for years but has been getting worse recently. This happening every night. Pain Assessment:  Pain Assessment  Location of Pain: Shoulder  Location Modifiers: Left  Severity of Pain: 3  Quality of Pain: Dull, Aching  Frequency of Pain: Intermittent  Aggravating Factors: Stretching, Straightening, Exercise  Relieving Factors: Rest  Result of Injury: No  Work-Related Injury: No  Are there other pain locations you wish to document?: No    Review of Systems:  Gretchen Thorne's review of systems has been performed by intake and observation. All past and current ROS forms have been scanned into the medical record. She has been instructed to contact her primary care provider regarding ROS issues if not already being addressed at this time. pain    OBJECTIVE  PHYSICAL EXAM  Vital Signs: There were no vitals filed for this visit. Body mass index is 23.34 kg/m². General Appearance: Patient is adequately groomed with no evidence of malnutrition   Orientation: Patient is alert and oriented to person, place and time  Mood and Affect: Neutral/Euthymic(normal)  Gait and Station: normal    Left Shoulder Examination  Inspection:    Visual deformity noted: No    No swelling noted. No erythema or ecchymosis. Palpation: mild tenderness to palpation on the anterior region. Range of Motion:  limited by pain, forward flexion to approximately 130 degrees, abduction to approximately 110 degrees, internal rotation to mid lower back    Stability and Special Testing:  Obriens test: positive              Apprehension test: not tested              Load and Shift test: not tested                                    Impingement test: not tested                         Sulcus sign: negative              Bear Hug test: not tested            Lift-Off test: not tested                   Cuff Drop Arm test:  negative    Strength: Forward flexion: 5/5        Abduction: 5/5        Internal Rotation: 5/5        External Rotation: 5/5    Neurologic: Sensation is intact to light touch throughout the median, ulnar and radial nerve distribution. Vascular: The bilateral upper extremities are warm and well-perfused with brisk capillary refill. Lymphatic: The lymphatic examination bilaterally reveals all areas to be without enlargement or induration    Skin: intact with no cellulitis, rashes, ulcerations, lymphedema or cutaneous lesions noted. Additional Examinations:  Right Upper Extremity:  Examination of the right upper extremity does not show any tenderness, deformity or injury. Range of motion is within normal limits. There is no gross instability. There are no rashes, ulcerations or lesions.   Strength and tone are normal.  Neck: Examination of the neck does not show any tenderness, deformity or injury. Range of motion is within normal limits. There is no gross instability. There are no rashes, ulcerations or lesions. Strength and tone are normal.      DIAGNOSTICS:  Xrays obtained in office today: Yes  Xrays reviewed today: Yes  Four views of the left shoulder show   Fracture: No  Dislocation: No  Acromion morphology: Type I   Acromioclavicular joint arthritis: mild  Glenohumeral joint arthritis: none  Humeral head superior migration: none    MRI of the left shoulder from August 11, 2020 shows glenohumeral joint fluid collection possibly hemorrhagic or serosanguineous. Mild superior labral fraying. Question Hill-Sachs injury versus impingement related change in humeral head. No macro fracture. No soft or bony Bankart. No humeral avulsion of the glenohumeral ligaments. Mild rotator cuff peritendinitis and tendinosis without tear. Mild acromioclavicular arthropathy without separation. Biceps long head tenosynovitis without tendinosis, dislocation or tear      ASSESSMENT (Medical Decision Making)    Soraya Colindres is a 26 y.o. female with the following diagnosis: Left shoulder pain with likely biceps tendinitis and rotator cuff tendinitis      ICD-10-CM    1. Left shoulder pain, unspecified chronicity  M25.512 XR SHOULDER LEFT (MIN 2 VIEWS)     [facility] PT - [facility]   2. Biceps tendinitis on left  M75.22    3. Rotator cuff tendinitis, left  M75.82            PLAN (Medical Decision Making)  Office Procedures:  Orders Placed This Encounter   Procedures    XR SHOULDER LEFT (MIN 2 VIEWS)    [facility] - [facility]     Referral Priority:   Routine     Referral Type:   Eval and Treat     Requested Specialty:   Physical Therapy     Number of Visits Requested:   1       Treatment Plan:    I discussed the diagnosis and treatment options with Soraya Colindres today.   At this time, would like to get the patient started on a regular anti-inflammatory and prescribed meloxicam 15 mg daily. Would also like to get her started in formal physical therapy for at least 1-2 sessions to get her started on a good home exercise program.  She has done this before with Chelsie Crum and would like to continue with that. We discussed the possibility of getting her referred for an EMG of her left upper extremity however she like to hold off on that for now. Patient was asked to follow-up in 3 weeks and we can re-evaluate her again at that time. Non-steroidal anti-inflammatories medications (NSAIDs) can be used to assist with pain control and to reduce inflammatory changes. These medications may be over-the-counter or prescribed. We discussed taking the NSAID properly and the precautions. The patient understands that this medication may potentially interfere with other medications. Patient was also instructed to immediately discontinue the medication is there is any possible complication. Kelby Cerrato was instructed to call the office if her symptoms worsen or if new symptoms appear prior to the next scheduled visit. She is specifically instructed to contact the office between now and schedule appointment if she has concerns related to her condition or if she needs assistance in scheduling any above tests. She is welcome to call for an appointment sooner if she has any additional concerns or questions. This dictation was performed with a verbal recognition program (DRAGON) and it was checked for errors. It is possible that there are still dictated errors within this office note. If so, please bring any errors to my attention for an addendum. All efforts were made to ensure that this office note is accurate.

## 2020-10-05 ENCOUNTER — OFFICE VISIT (OUTPATIENT)
Dept: ORTHOPEDIC SURGERY | Age: 30
End: 2020-10-05
Payer: COMMERCIAL

## 2020-10-05 VITALS — HEIGHT: 67 IN | WEIGHT: 149 LBS | BODY MASS INDEX: 23.39 KG/M2

## 2020-10-05 PROCEDURE — 99213 OFFICE O/P EST LOW 20 MIN: CPT | Performed by: ORTHOPAEDIC SURGERY

## 2020-10-05 NOTE — PROGRESS NOTES
MD Christie Aguiar, [place]         Orthopaedic Surgery and Sports Medicine      Patient Name: Geovanna Stewart  YOB: 1990  Patient's PCP is Christopher Harrison MD    SUBJECTIVE  Chief Complaint:  Shoulder Pain (LEFT)      History of Present Illness:  Geovanna Stewart is a right handed 26 y.o. female here regarding left shoulder pain. The pain began approx 9months ago, but worsened approx 3month ago. There was not a history of injury. Location: in the anterior glenohumeral region  Quality: aching and sharp   Pain Scale: 2-3/10  Context: overall course is worsening   Alleviating Factors: rest  Exacerbating Factors: activity, weight-lifting and certain exercises  Associated Symptoms: popping    Sleep pattern is affected by the chief complaint: No  The patient has not had PT. The patient has not had an injection. The patient has taken NSAIDs, ibuprofen as needed. The patient is working. Patient denies any previous problems with her shoulder. She does report numbness in her hands when sleeping. She reports this is been going on for years but has been getting worse recently. Last visit we got her restarted with a home exercise program.  She met with Marsha Friend, her physical therapist wants to get a good home exercise program restarted. She reports she has not been feeling much progress with her program.  We also got her started on meloxicam which she reports she took for 2 weeks which seemed to help and then she stopped taking it and the weakness and discomfort has returned.     Pain Assessment:  Pain Assessment  Location of Pain: Shoulder  Location Modifiers: Left  Severity of Pain: 2  Quality of Pain: Dull, Aching  Frequency of Pain: Intermittent  Aggravating Factors: Stretching, Straightening, Exercise  Relieving Factors: Rest, Ice  Result of Injury: No  Work-Related Injury: No  Are there other pain locations you wish to document?: No    Review of Systems:  Hailey Thorne's review of systems has been performed by intake and observation. All past and current ROS forms have been scanned into the medical record. She has been instructed to contact her primary care provider regarding ROS issues if not already being addressed at this time. There are no recent changes.  The most recent ROS was scanned into media on 12/09/2020    Past Medical History:  (see most recent intake form scanned into media on above date)  Past Medical History:   Diagnosis Date    Asthma     Cervical dysplasia or atypia, mild 2010    BRIAN (generalised anxiety disorder)     IC (intermittent claudication) ([facility] [address]     Interstitial cystitis     Dr Mark ASTUDILLO (postoperative nausea and vomiting)        Past Surgical History:  (see most recent intake form scanned into media on above date)  Past Surgical History:   Procedure Laterality Date    ARTHROSCOPY / ARTHROTOMY KNEE Left 12/14/2018    LEFT KNEE VIDEO ARTHROSCOPY, MEDIAL  MENISCUS REPAIR performed by Chun Gerber MD at [address] [place], [place] [address]way:  (see most recent intake form scanned into media on above date)  Allergies   Allergen Reactions    Vicodin [Hydrocodone-Acetaminophen] Rash     Tolerates acetaminophen        Medications:  (see most recent intake form scanned into media on above date)  Current Outpatient Medications   Medication Sig Dispense Refill    meloxicam (MOBIC) 15 MG tablet Take 1 tablet by mouth daily 30 tablet 2    tretinoin (RETIN-A) 0.025 % gel Apply topically nightly Twice a week      Clindamycin-Benzoyl Per, Refr, 1.2-5 % GEL Use as directed twice daily on  skin 90 g 0    albuterol sulfate HFA (PROAIR HFA) 108 (90 BASE) MCG/ACT inhaler Inhale 2-4 puffs into the lungs every 6 hours as needed for Wheezing May switch to either Ventolin or Proventil; whichever is covered 1 Inhaler 3    budesonide-formoterol (SYMBICORT) 160-4.5 MCG/ACT AERO Inhale 2 puffs into the lungs 2 times daily 1 Inhaler 3     No current facility-administered medications for this visit. Coagulation:  On a blood thinner: No  History of a bleeding disorder: No  History of a previous blood clot: No    Goal for treatment: Improve function and decrease pain    OBJECTIVE  PHYSICAL EXAM  Vital Signs: There were no vitals filed for this visit. Body mass index is 23.34 kg/m². General Appearance: Patient is adequately groomed with no evidence of malnutrition   Orientation: Patient is alert and oriented to person, place and time  Mood and Affect: Neutral/Euthymic(normal)  Gait and Station: normal    Left Shoulder Examination  Inspection:    Visual deformity noted: No    No swelling noted. No erythema or ecchymosis. Palpation: mild tenderness to palpation on the anterior region. Range of Motion:  limited by pain, forward flexion to approximately 140 degrees, abduction to approximately 130 degrees, internal rotation to mid lower back    Stability and Special Testing:  Obriens test: positive              Apprehension test: not tested              Load and Shift test: not tested                                    Impingement test: not tested                         Sulcus sign: negative              Bear Hug test: not tested            Lift-Off test: not tested                   Cuff Drop Arm test:  negative    Strength: Forward flexion: 5/5        Abduction: 5/5        Internal Rotation: 5/5        External Rotation: 5/5    Neurologic: Sensation is intact to light touch throughout the median, ulnar and radial nerve distribution. Vascular: The bilateral upper extremities are warm and well-perfused with brisk capillary refill.     Lymphatic: The lymphatic examination bilaterally reveals all areas to be without enlargement or induration    Skin: intact with no cellulitis, rashes, ulcerations, lymphedema or cutaneous lesions noted. Additional Examinations:  Right Upper Extremity:  Examination of the right upper extremity does not show any tenderness, deformity or injury. Range of motion is within normal limits. There is no gross instability. There are no rashes, ulcerations or lesions. Strength and tone are normal.  Neck: Examination of the neck does not show any tenderness, deformity or injury. Range of motion is within normal limits. There is no gross instability. There are no rashes, ulcerations or lesions. Strength and tone are normal.      DIAGNOSTICS:  Xrays obtained in office today: Yes  Xrays reviewed today: Yes  Four views of the left shoulder show   Fracture: No  Dislocation: No  Acromion morphology: Type I   Acromioclavicular joint arthritis: mild  Glenohumeral joint arthritis: none  Humeral head superior migration: none    MRI of the left shoulder from August 11, 2020 shows glenohumeral joint fluid collection possibly hemorrhagic or serosanguineous. Mild superior labral fraying. Question Hill-Sachs injury versus impingement related change in humeral head. No macro fracture. No soft or bony Bankart. No humeral avulsion of the glenohumeral ligaments. Mild rotator cuff peritendinitis and tendinosis without tear. Mild acromioclavicular arthropathy without separation. Biceps long head tenosynovitis without tendinosis, dislocation or tear      ASSESSMENT (Medical Decision Making)    Deja Patten is a 26 y.o. female with the following diagnosis: Left shoulder pain with biceps tendinitis and rotator cuff tendinitis and questionable Hill-Sachs injury. ICD-10-CM    1. Rotator cuff tendinitis, left  M75.82    2. Biceps tendinitis on left  M75.22            PLAN (Medical Decision Making)  Office Procedures:  No orders of the defined types were placed in this encounter. Treatment Plan:    I discussed the diagnosis and treatment options with Deja Patten today.   Today, we will do that the patient needs to continue working on her strengthening. Ready to get in touch with her physical therapist, Isael Hogan and go over specific protocol for her. She will follow-up with us on a as needed basis    Non-steroidal anti-inflammatories medications (NSAIDs) can be used to assist with pain control and to reduce inflammatory changes. These medications may be over-the-counter or prescribed. We discussed taking the NSAID properly and the precautions. The patient understands that this medication may potentially interfere with other medications. Patient was also instructed to immediately discontinue the medication is there is any possible complication. Charity Laughlin was instructed to call the office if her symptoms worsen or if new symptoms appear prior to the next scheduled visit. She is specifically instructed to contact the office between now and schedule appointment if she has concerns related to her condition or if she needs assistance in scheduling any above tests. She is welcome to call for an appointment sooner if she has any additional concerns or questions. Vika Mcfarlane PA-C, scribing for and in the presence of Dr. Roseanna Bautista   10/5/2020 2:32 PM    I, Dr. Christelle Estes, personally performed the services described in this documentation as scribed by Uli Vizcarra PA-C in my presence, and it is both accurate and complete. Christelle Estes MD        This dictation was performed with a verbal recognition program [facility]) and it was checked for errors. It is possible that there are still dictated errors within this office note. If so, please bring any errors to my attention for an addendum. All efforts were made to ensure that this office note is accurate.

## 2020-11-06 ENCOUNTER — HOSPITAL ENCOUNTER (OUTPATIENT)
Dept: PHYSICAL THERAPY | Age: 30
Setting detail: THERAPIES SERIES
Discharge: HOME | End: 2020-11-06
Payer: COMMERCIAL

## 2020-11-06 PROCEDURE — 97140 MANUAL THERAPY 1/> REGIONS: CPT | Performed by: PHYSICAL THERAPIST

## 2020-11-06 PROCEDURE — 97161 PT EVAL LOW COMPLEX 20 MIN: CPT | Performed by: PHYSICAL THERAPIST

## 2020-11-06 PROCEDURE — 97110 THERAPEUTIC EXERCISES: CPT | Performed by: PHYSICAL THERAPIST

## 2020-11-06 NOTE — PLAN OF CARE
The [facility] and [facility], [facility]  [address], [place], [address]  Phone: [phone]   Fax:     [phone]                                                       Physical Therapy Certification    Dear Referring Practitioner: dr Valeria Pfeiffer,    We had the pleasure of evaluating the following patient for physical therapy services at [address] [place] [address]. A summary of our findings can be found in the initial assessment below. This includes our plan of care. If you have any questions or concerns regarding these findings, please do not hesitate to contact me at the office phone number checked above.   Thank you for the referral.       Physician Signature:_______________________________Date:__________________  By signing above (or electronic signature), therapists plan is approved by physician      Patient: Jannet John   : 1990   MRN: [id number]  Referring Physician: Referring Practitioner: dr Valeria Pfeiffer      Evaluation Date: 2020      Medical Diagnosis Information:  Diagnosis: H58.477 (ICD-10-CM) - Left shoulder pain, unspecified chronicity   Treatment Diagnosis: M25.512 (ICD-10-CM) - Left shoulder pain, unspecified chronicity                                         Insurance information:      Precautions/ Contra-indications:     Latex Allergy:  [x]NO      []YES  Preferred Language for Healthcare:   [x]English       []Other:    C-SSRS Triggered by Intake questionnaire (Past 2 wk assessment):   [x] No, Questionnaire did not trigger screening.   [] Yes, Patient intake triggered further evaluation      [] C-SSRS Screening completed  [] PCP notified via Plan of Care  [] Emergency services notified      SUBJECTIVE: Patient stated complaint:  \"I am feeling weakness and a little bit of pain in certain positions\"      Relevant Medical History:  Knee surgery      Functional Disability Index: 22% uefi     Pain Scale: 4/10    Easing factors:   Rest    Provocative factors: motion/activity    Night Pain:    - complained of night pain associated with sleep position.      -  Type:       - Intermittent      Paresthesia complaints:   - no paresthesia complaints       Functional Limitations/Impairments: []  - Lifting  - reaching   - Grooming   - Carrying      - ADL's   - Driving  - Sports/Recreation activity      Occupation/School:       - domestic    -  Living Status/Prior Level of Function: This patient was independent in ADL's and IADL's prior to onset of symptoms. PACEMAKER:  - Denied having a pacemaker that would contraindicate the use of electrical modalities. METAL IMPLANTS:  - Denied metal implants that would contraindicate the use of thermal modalities. CANCER HISTORY:  - Denied a history of cancer that would contraindicate thermal modalities. OBJECTIVE:     ROM:   CERVICAL    SHOULDER: decreased shoulder er 25%     Joint Mobility: moderately decreased post and inf     Strength/Neuromuscular control:  4-/5 abd and l trap; 4/5 m trap and rhomboid     Dermatomal Sensation:  - Dermatomal sensation was intact to light touch bilaterally throughout upper and lower extremities. Deep tendon reflexes:  - DTR's were symmetrical and intact throughout. - 0 - (absent/areflexia)    - 1 - (diminished/hyporeflexia)  - 2 - (average/normal)  - 3 - (exaggerated/brisk)   - 4 - (clonus/very brisk/hyperreflexic)    Palpation:    Functional Mobility/Transfers:     Posture: + fwd head/kyphosis       Bandages/Dressings/Incisions:     Gait:     Orthopedic Special Tests:                           [x] Patient history, allergies, meds reviewed. Medical chart reviewed. See intake form. Review Of Systems (ROS):  [x]Performed Review of systems (Integumentary, CardioPulmonary, Neurological) by intake and observation. Intake form has been scanned into medical record.  Patient has been instructed to contact their primary care physician regarding ROS issues if not already being addressed at this time. Objective Review of Systems:  Cognitive, Communication, Behavior and Learning:  - The patient was alert, spoke coherently and was oriented to person, place and time. - Demonstrated no barriers to communication or processing information.  - Appeared literate, spoke [place] and had no significant hearing or vision impairment. - Had no abnormal behavioral responses, learning preferences or educational needs. Cardiopulmonary:  Edema:       Integumentary:  Nail beds:    Skin appearance:      MEDICARE CAP EXCEPTION DOCUMENTATION  I certify that this patient meets one of the below criteria necessary for becoming an exception to the Medicare cap on therapy services:  []  The patient has a condition that has a direct and significant impact on the need for therapy. (Significantly impacts the rate of recovery)  []  The patient has a complexity that has a direct and significant impact on the need for therapy. (Significantly impacts the rate of recovery and is associated with a primary condition.)       []  The patient has associated variables that influence the amount of treatment to include:  Social support, self-efficacy/motivation, prognosis, time since onset/acuity. []  The patient has generalized musculoskeletal conditions or a condition affecting multiple sites that will have a direct impact on the rate of recovery. []  The patient had a prior episode of outpatient therapy during this calendar year for a different condition. []  The patient has a mental or cognitive disorder in addition to the condition being treated that will have a direct and significant impact on the rate of recovery. Falls Risk Assessment (30 days):   [] Falls Risk assessed and no intervention required.   [] Falls Risk assessed and Patient requires intervention due to being higher risk   TUG score (>12s at risk):     [] Falls education provided, including        G-Codes: ASSESSMENT:     Functional Impairments   [x]Noted spinal or UE joint hypomobility   [x]Noted spinal or UE joint hypermobility   [x]Decreased UE functional ROM   [x]Decreased UE functional strength   [x]Abnormal reflexes/sensation/myotomal/dermatomal deficits   [x]Decreased RC/scapular/core strength and neuromuscular control   []other:      Functional Activity Limitations (from functional questionnaire and intake)   [x]Reduced ability to tolerate prolonged functional positions   [x]Reduced ability or difficulty with changes of positions or transfers between positions   [x]Reduced ability to maintain good posture and demonstrate good body mechanics with sitting, bending, and lifting   [x] Reduced ability or tolerance with driving and/or computer work   [x]Reduced ability to sleep   [x]Reduced ability to perform lifting, reaching, carrying tasks   [x]Reduced ability to tolerate impact through UE   [x]Reduced ability to reach behind back   [x]Reduced ability to  or hold objects   [x]Reduced ability to throw or toss an object    Participation Restrictions   [x]Reduced participation in self care activities   [x]Reduced participation in home management activities   [x]Reduced participation in work activities   [x]Reduced participation in social activities. [x]Reduced participation in sport activities. Classification :  - ligament or other connective tissue dysfunction. (Pattern 4D)  - localized inflammation.  (Pattern 4E)    Prognosis/Rehab Potential:       - Good     Tolerance of evaluation/treatment:     - Good     Physical Therapy Evaluation Complexity Justification  [] A history of present problem with:  [] no personal factors and/or comorbidities that impact the plan of care;  []1-2 personal factors and/or comorbidities that impact the plan of care  []3 personal factors and/or comorbidities that impact the plan of care  [] An examination of body systems using standardized tests and measures addressing any of mobilizations, spinal mobilization/manipulation. 3. Modalities as needed including: thermal agents, E-stim, US, iontophoresis as indicated. 4. Patient education on joint protection, activity modification, progression of HEP. HEP instruction: sleeper stretch, supine cane stretch, corner, t bands in supine, prone kal's, standing no $, sidelying abd   GOALS:  Patient stated goal:      Therapist goals for Patient:   Short Term Goals: To be achieved in: 2 weeks  - Independent in HEP and progression per patient tolerance, in order to prevent re-injury. -  Patient will have a decrease in pain to facilitate improvement in movement, function, and ADLs as indicated by Functional Deficits. Long Term Goals: To be achieved in: 6 weeks  - The patient is expected to demonstrate less than  2% impairment, limitation or restriction in:  - carrying, moving and handling objects. - Patient will demonstrate increased passive and active ROM to full, symmetrical and painfree to allow for proper joint functioning as indicated by patients Functional Deficits. - Patient will demonstrate an increase in Strength to symmetrical and painfree to allow for proper functional mobility as indicated by patients Functional Deficits. - Patient will return to desired, higher level upper extremity functional activities without increased symptoms or restriction.       Electronically signed by:  Michael Hinds PT , MPT

## 2020-11-06 NOTE — FLOWSHEET NOTE
Therapeutic Exercise and NMR EXR  [x] (93866) Provided verbal/tactile cueing for activities related to strengthening, flexibility, endurance, ROM for improvements in LE, proximal hip, and core control with self care, mobility, lifting, ambulation. [x] (87630) Provided verbal/tactile cueing for activities related to improving balance, coordination, kinesthetic sense, posture, motor skill, proprioception to assist with LE, proximal hip, and core control in self-care, mobility, lifting, ambulation and eccentric single leg control.      NMR and Therapeutic Activities:    [x] (74585 or 08371) Provided verbal/tactile cueing for activities related to improving balance, coordination, kinesthetic sense, posture, motor skill, proprioception and motor activation to allow for proper function of core, proximal hip and LE with self-care and ADLs and functional mobility.   [] (32896) Gait Re-education- Provided training and instruction to the patient for proper LE, core and proximal hip recruitment and positioning and eccentric body weight control with ambulation re-education including up and down stairs     Home Exercise Program:    [x] (20878) Reviewed/Progressed HEP activities related to strengthening, flexibility, endurance, ROM of core, proximal hip and LE for functional self-care, mobility, lifting and ambulation/stair navigation   [] (32564) Reviewed/Progressed HEP activities related to improving balance, coordination, kinesthetic sense, posture, motor skill, proprioception of core, proximal hip and LE for self-care, mobility, lifting, and ambulation/stair navigation      Manual Treatments:  PROM / STM / Oscillations-Mobs:  G-I, II, III, IV (PA's, Inf., Post.)  [x] (41592) Provided manual therapy to mobilize LE, proximal hip and/or LS spine soft tissue/joints for the purpose of modulating pain, promoting relaxation, increasing ROM, reducing/eliminating soft tissue swelling/inflammation/restriction, improving soft tissue extensibility and allowing for proper ROM for normal function with self-care, mobility, lifting and ambulation. Modalities:     [] GAME READY (VASO)- for significant edema, swelling, pain control. Charges:  Timed Code Treatment Minutes: 43'    Total Treatment Minutes: 79'       [x] EVAL (LOW) 34977 (typically 20 minutes face-to-face)  [] EVAL (MOD) 19454 (typically 30 minutes face-to-face)  [] EVAL (HIGH) 53164 (typically 45 minutes face-to-face)  [] RE-EVAL     [x] XZ(85820) x  1   [] IONTO  [] NMR (24029) x     [] VASO  [x] Manual (00645) x   1  [] Other:  [] TA x      [] Mech Traction (53722)  [] ES(attended) (48536)      [] ES (un) (50690):         ASSESSMENT:  See eval      GOALS:   Patient stated goal:    [] Progressing: [] Met: [] Not Met: [] Adjusted    Therapist goals for Patient:   Short Term Goals: To be achieved in: 2 weeks  1. Independent in HEP and progression per patient tolerance, in order to prevent re-injury. [] Progressing: [] Met: [] Not Met: [] Adjusted   2. Patient will have a decrease in pain to facilitate improvement in movement, function, and ADLs as indicated by Functional Deficits. [] Progressing: [] Met: [] Not Met: [] Adjusted    Long Term Goals: To be achieved in:   6 weeks  - The patient is expected to demonstrate less than  2% impairment, limitation or restriction in:  - carrying, moving and handling objects. - Patient will demonstrate increased passive and active ROM to full, symmetrical and painfree to allow for proper joint functioning as indicated by patients Functional Deficits. [] Progressing: [] Met: [] Not Met: [] Adjusted  - Patient will demonstrate an increase in Strength to symmetrical and painfree to allow for proper functional mobility as indicated by patients Functional Deficits.    [] Progressing: [] Met: [] Not Met: [] Adjusted  - Patient will return to desired, higher level upper extremity functional activities without increased symptoms or restriction. [] Progressing: [] Met: [] Not Met: [] Adjusted            Overall Progression Towards Functional goals/ Treatment Progress Update:  [] Patient is progressing as expected towards functional goals listed. [] Progression is slowed due to complexities/Impairments listed. [] Progression has been slowed due to co-morbidities. [x] Plan just implemented, too soon to assess goals progression <30days   [] Goals require adjustment due to lack of progress  [] Patient is not progressing as expected and requires additional follow up with physician  [] Other    Prognosis for POC: [x] Good [] Fair  [] Poor      Patient requires continued skilled intervention: [x] Yes  [] No    Treatment/Activity Tolerance:  [x] Patient able to complete treatment  [] Patient limited by fatigue  [] Patient limited by pain    [] Patient limited by other medical complications  [] Other:         Return to Play: (if applicable)   []  Stage 1: Intro to Strength   []  Stage 2: Return to Run and Strength   []  Stage 3: Return to Jump and Strength   []  Stage 4: Dynamic Strength and Agility   []  Stage 5: Sport Specific Training     []  Ready to Return to Play, Meets All Above Stages   []  Not Ready for Return to Sports   Comments:                               PLAN: See eval  [] Continue per plan of care [] Alter current plan (see comments above)  [x] Plan of care initiated [] Hold pending MD visit [] Discharge      Electronically signed by:  Aruna Ross PT, MPT     Note: If patient does not return for scheduled/ recommended follow up visits, this note will serve as a discharge from care along with most recent update on progress.

## 2021-12-08 ENCOUNTER — HOSPITAL ENCOUNTER (OUTPATIENT)
Dept: PHYSICAL THERAPY | Age: 31
Setting detail: THERAPIES SERIES
Discharge: HOME | End: 2021-12-08
Payer: COMMERCIAL

## 2021-12-08 PROCEDURE — 97110 THERAPEUTIC EXERCISES: CPT | Performed by: PHYSICAL THERAPIST

## 2021-12-08 PROCEDURE — 97140 MANUAL THERAPY 1/> REGIONS: CPT | Performed by: PHYSICAL THERAPIST

## 2021-12-08 PROCEDURE — 97161 PT EVAL LOW COMPLEX 20 MIN: CPT | Performed by: PHYSICAL THERAPIST

## 2021-12-10 ENCOUNTER — HOSPITAL ENCOUNTER (OUTPATIENT)
Dept: PHYSICAL THERAPY | Age: 31
Setting detail: THERAPIES SERIES
Discharge: HOME | End: 2021-12-10
Payer: COMMERCIAL

## 2021-12-10 PROCEDURE — 97140 MANUAL THERAPY 1/> REGIONS: CPT | Performed by: PHYSICAL THERAPIST

## 2021-12-10 PROCEDURE — 97110 THERAPEUTIC EXERCISES: CPT | Performed by: PHYSICAL THERAPIST

## 2021-12-12 NOTE — PLAN OF CARE
Parag Huitron [address],  Sports Performance and Rehabilitation, [facility] 6199 [address] [place] [address]  793 [facility],[address]   [place], [place]. [address]  P: [phone]  F: [phone]      Physical Therapy Certification    Dear Referring Practitioner: dr Senia Pacheco,    We had the pleasure of evaluating the following patient for physical therapy services at [address] [place] [address]. A summary of our findings can be found in the initial assessment below. This includes our plan of care. If you have any questions or concerns regarding these findings, please do not hesitate to contact me at the office phone number checked above. Thank you for the referral.       Physician Signature:_______________________________Date:__________________  By signing above (or electronic signature), therapists plan is approved by physician      Patient: Corinda Eisenmenger   : 1990   MRN: [id number]  Referring Physician: Referring Practitioner: dr Senia Pacheco      Evaluation Date: 2022      Medical Diagnosis Information:  Diagnosis: T26.535 left shoulder pain                                             Insurance information:      Precautions/ Contra-indications:   Latex Allergy:  [x]NO      []YES  Preferred Language for Healthcare:   [x]English       []Other:    C-SSRS Triggered by Intake questionnaire (Past 2 wk assessment):   [x] No, Questionnaire did not trigger screening.   [] Yes, Patient intake triggered further evaluation      [] C-SSRS Screening completed  [] PCP notified via Plan of Care  [] Emergency services notified      SUBJECTIVE: Patient stated complaint: reports that she had rtc cuff surgery early in the year. Failed therapy at multiple locations. Presents with signs of adhesive capsulitis today.       Relevant Medical History    Functional Disability Index:  44% uefi   Pain Scale: 0-7/10  Easing factors:   Rest     Provocative factors: activity/motion     Night Pain:    - complained of night pain associated with sleep position. Type:   - Constant          Paresthesia complaints:   - no paresthesia complaints       Functional Limitations/Impairments: []  - Lifting  - reaching   - Grooming   - Carrying      - ADL's   - Driving  - Sports/Recreation activity      Occupation/School:   Labor intensive       Living Status/Prior Level of Function: This patient was independent in ADL's and IADL's prior to onset of symptoms. PACEMAKER:  - Denied having a pacemaker that would contraindicate the use of electrical modalities. - METAL IMPLANTS:  - Denied metal implants that would contraindicate the use of thermal modalities. -CANCER HISTORY:  - Denied a history of cancer that would contraindicate thermal modalities. OBJECTIVE:     ROM:   CERVICAL    SHOULDER: decreased arom 70%; 50% prom     Joint Mobility: severe joint immobility - global     Strength/Neuromuscular control:  3-/5 shoulder     Dermatomal Sensation:  - Dermatomal sensation was intact to light touch bilaterally throughout upper and lower extremities. Deep tendon reflexes:  - DTR's were symmetrical and intact throughout. Palpation:    Functional Mobility/Transfers:     Posture: + fwd head/kyphosis       Bandages/Dressings/Incisions:     Gait:     Orthopedic Special Tests:                           [x] Patient history, allergies, meds reviewed. Medical chart reviewed. See intake form. Review Of Systems (ROS):  [x]Performed Review of systems (Integumentary, CardioPulmonary, Neurological) by intake and observation. Intake form has been scanned into medical record. Patient has been instructed to contact their primary care physician regarding ROS issues if not already being addressed at this time. Objective Review of Systems:  Cognitive, Communication, Behavior and Learning:  - The patient was alert, spoke coherently and was oriented to person, place and time.   - Demonstrated no barriers to communication or processing information.  - Appeared literate, spoke [place] and had no significant hearing or vision impairment. - Had no abnormal behavioral responses, learning preferences or educational needs. Cardiopulmonary:  Edema:       Integumentary:  Nail beds:    Skin appearance:      ASSESSMENT:    Functional Impairments   [x]Noted spinal or UE joint hypomobility   [x]Noted spinal or UE joint hypermobility   [x]Decreased UE functional ROM   [x]Decreased UE functional strength   [x]Abnormal reflexes/sensation/myotomal/dermatomal deficits   [x]Decreased RC/scapular/core strength and neuromuscular control   []other:      Functional Activity Limitations (from functional questionnaire and intake)   [x]Reduced ability to tolerate prolonged functional positions   [x]Reduced ability or difficulty with changes of positions or transfers between positions   [x]Reduced ability to maintain good posture and demonstrate good body mechanics with sitting, bending, and lifting   [x] Reduced ability or tolerance with driving and/or computer work   [x]Reduced ability to sleep   [x]Reduced ability to perform lifting, reaching, carrying tasks   [x]Reduced ability to tolerate impact through UE   [x]Reduced ability to reach behind back   [x]Reduced ability to  or hold objects   [x]Reduced ability to throw or toss an object    Participation Restrictions   [x]Reduced participation in self care activities   [x]Reduced participation in home management activities   [x]Reduced participation in work activities   [x]Reduced participation in social activities. [x]Reduced participation in sport activities. Classification :  - ligament or other connective tissue dysfunction. (Pattern 4D)  - localized inflammation.  (Pattern 4E)    Prognosis/Rehab Potential:     - Good       Tolerance of evaluation/treatment:     - Good   Physical Therapy Evaluation Complexity Justification  [] A history of present problem with:  [] no personal factors and/or comorbidities that impact the plan of care;  []1-2 personal factors and/or comorbidities that impact the plan of care  []3 personal factors and/or comorbidities that impact the plan of care  [x] An examination of body systems using standardized tests and measures addressing any of the following: body structures and functions (impairments), activity limitations, and/or participation restrictions;:  [] a total of 1-2 or more elements   [] a total of 3 or more elements   [] a total of 4 or more elements   [x] A clinical presentation with:  [] stable and/or uncomplicated characteristics   [x] evolving clinical presentation with changing characteristics  [] unstable and unpredictable characteristics;   [x] Clinical decision making of [x] low, [] moderate, [] high complexity using standardized patient assessment instrument and/or measurable assessment of functional outcome.     [x] EVAL (LOW) 31993 (typically 30 minutes face-to-face)  [] EVAL (MOD) 55956 (typically 30 minutes face-to-face)  [] EVAL (HIGH) 34559 (typically 45 minutes face-to-face)  [] RE-EVAL         Co-morbidities/Complexities (which will affect course of rehabilitation):   []None           Arthritic conditions   []Rheumatoid arthritis (M05.9)  []Osteoarthritis (M19.91)   Cardiovascular conditions   []Hypertension (I10)  []Hyperlipidemia (E78.5)  []Angina pectoris (I20)  []Atherosclerosis (I70)   Musculoskeletal conditions   []Disc pathology   []Congenital spine pathologies   []Prior surgical intervention  []Osteoporosis (M81.8)  []Osteopenia (M85.8)   Endocrine conditions   []Hypothyroid (E03.9)  []Hyperthyroid Gastrointestinal conditions   []Constipation (I19.12)   Metabolic conditions   []Morbid obesity (E66.01)  []Diabetes type 1(E10.65) or 2 (E11.65)   []Neuropathy (G60.9)     Pulmonary conditions   []Asthma (J45)  []Coughing   []COPD (J44.9)   Psychological Disorders  []Anxiety (F41.9)  []Depression (F32.9)   []Other:   []Other: PLAN:  Frequency/Duration:  2 days per week for 10 Weeks:  INTERVENTIONS:  1. Therapeutic exercise including: strength training, ROM, NMR and proprioception for the scapula, core and Upper extremity  2. Manual therapy as indicated including Dry Needling/IASTM, STM, PROM, Gr I-IV mobilizations, spinal mobilization/manipulation. 3. Modalities as needed including: thermal agents, E-stim, US, iontophoresis as indicated. 4. Patient education on joint protection, activity modification, progression of HEP. HEP instruction:  See flowsheet     GOALS:  Patient stated goal:  \"full recovery\"    Therapist goals for Patient:   Short Term Goals: To be achieved in: 2 weeks  - Independent in HEP and progression per patient tolerance, in order to prevent re-injury. -  Patient will have a decrease in pain to facilitate improvement in movement, function, and ADLs as indicated by Functional Deficits. Long Term Goals: To be achieved in: 10 weeks  - The patient is expected to demonstrate less than 10% impairment, limitation or restriction in:  -- carrying, moving and handling objects. - Patient will demonstrate increased passive and active ROM to full, symmetrical and painfree to allow for proper joint functioning as indicated by patients Functional Deficits. - Patient will demonstrate an increase in Strength to symmetrical and painfree to allow for proper functional mobility as indicated by patients Functional Deficits. - Patient will return to desired, higher level upper extremity functional activities without increased symptoms or restriction.       Electronically signed by:  Jacinto Goins PT, MPT

## 2021-12-12 NOTE — FLOWSHEET NOTE
Parag Huitron [address],  Sports Performance and Rehabilitation, [facility] [address]199 [address] [place] [address]  [address] [place] [address],[address]   [facility]. [address]  P: [phone]  F: [phone]        Physical Therapy Treatment Note/ Progress Report:           Date:  12/10/2022  Patient Name:  Jerzy Dukes    :  1990  MRN: [id number]  Restrictions/Precautions:    Medical/Treatment Diagnosis Information:    Diagnosis: U13.515 left shoulder pain      Insurance/Certification information:     Physician Information:     Has the plan of care been signed (Y/N):        []  Yes  [x]  No     Date of Patient follow up with Physician:       Is this a Progress Report:     []  Yes  [x]  No        If Yes:  Date Range for reporting period:  Beginning  Ending    Progress report will be due (10 Rx or 30 days whichever is less):        Recertification will be due (POC Duration  / 90 days whichever is less):         Visit # Insurance Allowable Auth Required   2  []  Yes []  No        Functional Scale:    Date assessed:        Latex Allergy:  [x]NO      []YES  Preferred Language for Healthcare:   [x]English       []other:      Pain level:  nr/10     SUBJECTIVE:  \"more motion\"    OBJECTIVE: approx 168, 65 er, 40 ir prom    Observation:    Test measurements:      RESTRICTIONS/PRECAUTIONS:     Exercises/Interventions:       Therapeutic Ex (52317) Sets/sec Reps Notes/CUES   Ll/ld er  5' x 2 (1#)    Supine cane @ er   4 x 20\"    Supine overhead   10 x 10\"     Supine ll/ld ir   4'     Wall walk   10\" x 5    Standing ir giovannygan   6x                                         Manual Intervention ([id number])      Prom/stm   25'                                                                                                                                             Therapeutic Exercise and NMR EXR  [x] (98650) Provided verbal/tactile cueing for activities related to strengthening, flexibility, endurance, ROM for improvements in LE, proximal hip, and core control with self care, mobility, lifting, ambulation. [x] (45615) Provided verbal/tactile cueing for activities related to improving balance, coordination, kinesthetic sense, posture, motor skill, proprioception to assist with LE, proximal hip, and core control in self-care, mobility, lifting, ambulation and eccentric single leg control. NMR and Therapeutic Activities:    [x] (54376 or 38010) Provided verbal/tactile cueing for activities related to improving balance, coordination, kinesthetic sense, posture, motor skill, proprioception and motor activation to allow for proper function of core, proximal hip and LE with self-care and ADLs and functional mobility.   [] (57664) Gait Re-education- Provided training and instruction to the patient for proper LE, core and proximal hip recruitment and positioning and eccentric body weight control with ambulation re-education including up and down stairs     Home Exercise Program:    [x] (58305) Reviewed/Progressed HEP activities related to strengthening, flexibility, endurance, ROM of core, proximal hip and LE for functional self-care, mobility, lifting and ambulation/stair navigation   [] (02739) Reviewed/Progressed HEP activities related to improving balance, coordination, kinesthetic sense, posture, motor skill, proprioception of core, proximal hip and LE for self-care, mobility, lifting, and ambulation/stair navigation      Manual Treatments:  PROM / STM / Oscillations-Mobs:  G-I, II, III, IV (PA's, Inf., Post.)  [x] (66507) Provided manual therapy to mobilize LE, proximal hip and/or LS spine soft tissue/joints for the purpose of modulating pain, promoting relaxation, increasing ROM, reducing/eliminating soft tissue swelling/inflammation/restriction, improving soft tissue extensibility and allowing for proper ROM for normal function with self-care, mobility, lifting and ambulation.      Modalities:     [] GAME READY (VASO)- for significant edema, swelling, pain control. Charges:  Timed Code Treatment Minutes: 43'   Total Treatment Minutes: 43'       [] EVAL (LOW) 20749 (typically 20 minutes face-to-face)  [] EVAL (MOD) 41925 (typically 30 minutes face-to-face)  [] EVAL (HIGH) 49490 (typically 45 minutes face-to-face)  [] RE-EVAL     [x] AP(73472) x  1   [] IONTO  [] NMR (14917) x     [] VASO  [x] Manual (02536) x 2  [] Other:  [] TA x      [] Mech Traction (84043)  [] ES(attended) (00404)      [] ES (un) (92413):         ASSESSMENT:   Tolerated       GOALS:  Patient stated goal:     [] Progressing: [] Met: [] Not Met: [] Adjusted    Therapist goals for Patient:   Short Term Goals: To be achieved in: 2 weeks  1. Independent in HEP and progression per patient tolerance, in order to prevent re-injury. [x] Progressing: [] Met: [] Not Met: [] Adjusted   2. Patient will have a decrease in pain to facilitate improvement in movement, function, and ADLs as indicated by Functional Deficits. [x] Progressing: [] Met: [] Not Met: [] Adjusted     Long Term Goals: To be achieved in:   10 weeks  - The patient is expected to demonstrate less than 10% impairment, limitation or restriction in:  -- carrying, moving and handling objects. - Patient will demonstrate increased passive and active ROM to full, symmetrical and painfree to allow for proper joint functioning as indicated by patients Functional Deficits. [x] Progressing: [] Met: [] Not Met: [] Adjusted   - Patient will demonstrate an increase in Strength to symmetrical and painfree to allow for proper functional mobility as indicated by patients Functional Deficits. [x] Progressing: [] Met: [] Not Met: [] Adjusted   - Patient will return to desired, higher level upper extremity functional activities without increased symptoms or restriction.       [x] Progressing: [] Met: [] Not Met: [] Adjusted           Overall Progression Towards Functional goals/ Treatment Progress Update:  [] Patient is progressing as expected towards functional goals listed. [] Progression is slowed due to complexities/Impairments listed. [] Progression has been slowed due to co-morbidities. [x] Plan just implemented, too soon to assess goals progression <30days   [] Goals require adjustment due to lack of progress  [] Patient is not progressing as expected and requires additional follow up with physician  [] Other    Prognosis for POC: [x] Good [] Fair  [] Poor      Patient requires continued skilled intervention: [x] Yes  [] No    Treatment/Activity Tolerance:  [x] Patient able to complete treatment  [] Patient limited by fatigue  [] Patient limited by pain    [] Patient limited by other medical complications  [] Other:         Return to Play: (if applicable)   []  Stage 1: Intro to Strength   []  Stage 2: Return to Run and Strength   []  Stage 3: Return to Jump and Strength   []  Stage 4: Dynamic Strength and Agility   []  Stage 5: Sport Specific Training     []  Ready to Return to Play, Meets All Above Stages   []  Not Ready for Return to Sports   Comments:                               PLAN: See eval  [x] Continue per plan of care [] Alter current plan (see comments above)  [] Plan of care initiated [] Hold pending MD visit [] Discharge      Electronically signed by:  Deedee Bosch PT, MPT     Note: If patient does not return for scheduled/ recommended follow up visits, this note will serve as a discharge from care along with most recent update on progress.

## 2021-12-12 NOTE — FLOWSHEET NOTE
Parag Huitron [address],  [facility], [place] [address] [employer]  [address] [facility],[address]   [place], [place]. [address]  P: [phone]  F: [phone]        Physical Therapy Treatment Note/ Progress Report:           Date:  2022  Patient Name:  Jacqueline Starks    :  1990  MRN: [id number]  Restrictions/Precautions:    Medical/Treatment Diagnosis Information:  · Diagnosis: m25.512 left shoulder pain  ·    Insurance/Certification information:     Physician Information:  Referring Practitioner: dr Stas Eason  Has the plan of care been signed (Y/N):        []  Yes  [x]  No     Date of Patient follow up with Physician:       Is this a Progress Report:     []  Yes  [x]  No        If Yes:  Date Range for reporting period:  Beginning  Ending    Progress report will be due (10 Rx or 30 days whichever is less):        Recertification will be due (POC Duration  / 90 days whichever is less):         Visit # Insurance Allowable Auth Required   1  []  Yes []  No        Functional Scale:    Date assessed:        Latex Allergy:  [x]NO      []YES  Preferred Language for Healthcare:   [x]English       []other:      Pain level:  7/10     SUBJECTIVE:  See eval    OBJECTIVE: See eval   Observation:    Test measurements:      RESTRICTIONS/PRECAUTIONS:     Exercises/Interventions:       Therapeutic Ex (22706) Sets/sec Reps Notes/CUES   Ll/ld er  6'     Supine cane @ er   4 x 20\"    Supine overhead   10 x 10\"     Supine ll/ld ir   4'     Wall walk   10\" x 5                                              Manual Intervention ([id number])      Prom/stm   25'                                                                                                                                             Therapeutic Exercise and NMR EXR  [x] (30872) Provided verbal/tactile cueing for activities related to strengthening, flexibility, endurance, ROM for improvements in LE, proximal hip, and core control with self care, mobility, lifting, ambulation. [x] (82891) Provided verbal/tactile cueing for activities related to improving balance, coordination, kinesthetic sense, posture, motor skill, proprioception to assist with LE, proximal hip, and core control in self-care, mobility, lifting, ambulation and eccentric single leg control. NMR and Therapeutic Activities:    [x] (04697 or 57415) Provided verbal/tactile cueing for activities related to improving balance, coordination, kinesthetic sense, posture, motor skill, proprioception and motor activation to allow for proper function of core, proximal hip and LE with self-care and ADLs and functional mobility.   [] (06838) Gait Re-education- Provided training and instruction to the patient for proper LE, core and proximal hip recruitment and positioning and eccentric body weight control with ambulation re-education including up and down stairs     Home Exercise Program:    [x] (23701) Reviewed/Progressed HEP activities related to strengthening, flexibility, endurance, ROM of core, proximal hip and LE for functional self-care, mobility, lifting and ambulation/stair navigation   [] (73614) Reviewed/Progressed HEP activities related to improving balance, coordination, kinesthetic sense, posture, motor skill, proprioception of core, proximal hip and LE for self-care, mobility, lifting, and ambulation/stair navigation      Manual Treatments:  PROM / STM / Oscillations-Mobs:  G-I, II, III, IV (PA's, Inf., Post.)  [x] (13602) Provided manual therapy to mobilize LE, proximal hip and/or LS spine soft tissue/joints for the purpose of modulating pain, promoting relaxation, increasing ROM, reducing/eliminating soft tissue swelling/inflammation/restriction, improving soft tissue extensibility and allowing for proper ROM for normal function with self-care, mobility, lifting and ambulation.      Modalities:     [] GAME READY (VASO)- for significant edema, swelling, pain control. Charges:  Timed Code Treatment Minutes: 35'   Total Treatment Minutes: 72'      [x] EVAL (LOW) 63024 (typically 20 minutes face-to-face)  [] EVAL (MOD) 00769 (typically 30 minutes face-to-face)  [] EVAL (HIGH) 33875 (typically 45 minutes face-to-face)  [] RE-EVAL     [x] UG(05356) x  1   [] IONTO  [] NMR (46418) x     [] VASO  [x] Manual (71195) x  1  [] Other:  [] TA x      [] Mech Traction (48838)  [] ES(attended) (43845)      [] ES (un) (67458):         ASSESSMENT:  See eval      GOALS:  Patient stated goal:     [] Progressing: [] Met: [] Not Met: [] Adjusted    Therapist goals for Patient:   Short Term Goals: To be achieved in: 2 weeks  1. Independent in HEP and progression per patient tolerance, in order to prevent re-injury. [] Progressing: [] Met: [] Not Met: [] Adjusted   2. Patient will have a decrease in pain to facilitate improvement in movement, function, and ADLs as indicated by Functional Deficits. [] Progressing: [] Met: [] Not Met: [] Adjusted     Long Term Goals: To be achieved in:   10 weeks  - The patient is expected to demonstrate less than 10% impairment, limitation or restriction in:  -- carrying, moving and handling objects. - Patient will demonstrate increased passive and active ROM to full, symmetrical and painfree to allow for proper joint functioning as indicated by patients Functional Deficits. [] Progressing: [] Met: [] Not Met: [] Adjusted   - Patient will demonstrate an increase in Strength to symmetrical and painfree to allow for proper functional mobility as indicated by patients Functional Deficits. [] Progressing: [] Met: [] Not Met: [] Adjusted   - Patient will return to desired, higher level upper extremity functional activities without increased symptoms or restriction.       [] Progressing: [] Met: [] Not Met: [] Adjusted           Overall Progression Towards Functional goals/ Treatment Progress Update:  [] Patient is progressing as expected towards functional goals listed. [] Progression is slowed due to complexities/Impairments listed. [] Progression has been slowed due to co-morbidities. [x] Plan just implemented, too soon to assess goals progression <30days   [] Goals require adjustment due to lack of progress  [] Patient is not progressing as expected and requires additional follow up with physician  [] Other    Prognosis for POC: [x] Good [] Fair  [] Poor      Patient requires continued skilled intervention: [x] Yes  [] No    Treatment/Activity Tolerance:  [x] Patient able to complete treatment  [] Patient limited by fatigue  [] Patient limited by pain    [] Patient limited by other medical complications  [] Other:         Return to Play: (if applicable)   []  Stage 1: Intro to Strength   []  Stage 2: Return to Run and Strength   []  Stage 3: Return to Jump and Strength   []  Stage 4: Dynamic Strength and Agility   []  Stage 5: Sport Specific Training     []  Ready to Return to Play, Meets All Above Stages   []  Not Ready for Return to Sports   Comments:                               PLAN: See eval  [] Continue per plan of care [] Alter current plan (see comments above)  [x] Plan of care initiated [] Hold pending MD visit [] Discharge      Electronically signed by:  Tracy Pi, PT, MPT     Note: If patient does not return for scheduled/ recommended follow up visits, this note will serve as a discharge from care along with most recent update on progress.

## 2021-12-15 ENCOUNTER — HOSPITAL ENCOUNTER (OUTPATIENT)
Dept: PHYSICAL THERAPY | Age: 31
Setting detail: THERAPIES SERIES
Discharge: HOME | End: 2021-12-15
Payer: COMMERCIAL

## 2021-12-15 PROCEDURE — 97140 MANUAL THERAPY 1/> REGIONS: CPT | Performed by: PHYSICAL THERAPIST

## 2021-12-16 ENCOUNTER — HOSPITAL ENCOUNTER (OUTPATIENT)
Dept: PHYSICAL THERAPY | Age: 31
Setting detail: THERAPIES SERIES
Discharge: HOME | End: 2021-12-16
Payer: COMMERCIAL

## 2021-12-16 PROCEDURE — 97110 THERAPEUTIC EXERCISES: CPT | Performed by: PHYSICAL THERAPIST

## 2021-12-16 PROCEDURE — 97140 MANUAL THERAPY 1/> REGIONS: CPT | Performed by: PHYSICAL THERAPIST

## 2021-12-17 ENCOUNTER — HOSPITAL ENCOUNTER (OUTPATIENT)
Dept: PHYSICAL THERAPY | Age: 31
Setting detail: THERAPIES SERIES
End: 2021-12-17
Payer: COMMERCIAL

## 2021-12-18 NOTE — FLOWSHEET NOTE
Parag Huitron [address],  [facility], [place] [address] [employer]  [address] [facility],[address]   [place], [place]. [address]  P: [phone]  F: [phone]        Physical Therapy Treatment Note/ Progress Report:           Date:  2022  Patient Name:  Stefanie Mack    :  1990  MRN: [id number]  Restrictions/Precautions:    Medical/Treatment Diagnosis Information:    Diagnosis: D47.480 left shoulder pain      Insurance/Certification information:     Physician Information:     Has the plan of care been signed (Y/N):        []  Yes  [x]  No     Date of Patient follow up with Physician:       Is this a Progress Report:     []  Yes  [x]  No        If Yes:  Date Range for reporting period:  Beginning  Ending    Progress report will be due (10 Rx or 30 days whichever is less):        Recertification will be due (POC Duration  / 90 days whichever is less):         Visit # Insurance Allowable Auth Required   4  []  Yes []  No        Functional Scale:    Date assessed:        Latex Allergy:  [x]NO      []YES  Preferred Language for Healthcare:   [x]English       []other:      Pain level:  nr/10     SUBJECTIVE:  \"I am feeling good. Trula Blew \"     OBJECTIVE:      Observation: arom decreased 30% with er    Test measurements:      RESTRICTIONS/PRECAUTIONS:     Exercises/Interventions:       Therapeutic Ex (62643) Sets/sec Reps Notes/CUES   Ll/ld er  5' x 2 (1#)    Supine cane @ er   4 x 20\"    Supine overhead   10 x 10\"     Supine ll/ld ir   4'     Wall walk   10\" x 5    Standing ir mulligan   6x     Seated ball roll on wedge   20x                                   Manual Intervention (46307)      Prom/stm   25'                                                                                                                                             Therapeutic Exercise and NMR EXR  [x] (87723) Provided verbal/tactile cueing for activities related to strengthening, flexibility, endurance, ROM for improvements in LE, proximal hip, and core control with self care, mobility, lifting, ambulation. [x] (05742) Provided verbal/tactile cueing for activities related to improving balance, coordination, kinesthetic sense, posture, motor skill, proprioception to assist with LE, proximal hip, and core control in self-care, mobility, lifting, ambulation and eccentric single leg control. NMR and Therapeutic Activities:    [x] (60038 or 01362) Provided verbal/tactile cueing for activities related to improving balance, coordination, kinesthetic sense, posture, motor skill, proprioception and motor activation to allow for proper function of core, proximal hip and LE with self-care and ADLs and functional mobility.   [] (32990) Gait Re-education- Provided training and instruction to the patient for proper LE, core and proximal hip recruitment and positioning and eccentric body weight control with ambulation re-education including up and down stairs     Home Exercise Program:    [x] (93392) Reviewed/Progressed HEP activities related to strengthening, flexibility, endurance, ROM of core, proximal hip and LE for functional self-care, mobility, lifting and ambulation/stair navigation   [] (52130) Reviewed/Progressed HEP activities related to improving balance, coordination, kinesthetic sense, posture, motor skill, proprioception of core, proximal hip and LE for self-care, mobility, lifting, and ambulation/stair navigation      Manual Treatments:  PROM / STM / Oscillations-Mobs:  G-I, II, III, IV (PA's, Inf., Post.)  [x] (40621) Provided manual therapy to mobilize LE, proximal hip and/or LS spine soft tissue/joints for the purpose of modulating pain, promoting relaxation, increasing ROM, reducing/eliminating soft tissue swelling/inflammation/restriction, improving soft tissue extensibility and allowing for proper ROM for normal function with self-care, mobility, lifting and ambulation.      Modalities:     [] GAME READY (VASO)- for significant edema, swelling, pain control. Charges:  Timed Code Treatment Minutes: 43'   Total Treatment Minutes: 43'       [] EVAL (LOW) 88764 (typically 20 minutes face-to-face)  [] EVAL (MOD) 63412 (typically 30 minutes face-to-face)  [] EVAL (HIGH) 05950 (typically 45 minutes face-to-face)  [] RE-EVAL     [x] OI(16118) x  1   [] IONTO  [] NMR (05833) x     [] VASO  [x] Manual (92824) x 2  [] Other:  [] TA x      [] Mech Traction (62012)  [] ES(attended) (75778)      [] ES (un) (04378):         ASSESSMENT:   Tolerated       GOALS:  Patient stated goal:     [] Progressing: [] Met: [] Not Met: [] Adjusted    Therapist goals for Patient:   Short Term Goals: To be achieved in: 2 weeks  1. Independent in HEP and progression per patient tolerance, in order to prevent re-injury. [x] Progressing: [] Met: [] Not Met: [] Adjusted   2. Patient will have a decrease in pain to facilitate improvement in movement, function, and ADLs as indicated by Functional Deficits. [x] Progressing: [] Met: [] Not Met: [] Adjusted     Long Term Goals: To be achieved in:   10 weeks  - The patient is expected to demonstrate less than 10% impairment, limitation or restriction in:  -- carrying, moving and handling objects. - Patient will demonstrate increased passive and active ROM to full, symmetrical and painfree to allow for proper joint functioning as indicated by patients Functional Deficits. [x] Progressing: [] Met: [] Not Met: [] Adjusted   - Patient will demonstrate an increase in Strength to symmetrical and painfree to allow for proper functional mobility as indicated by patients Functional Deficits. [x] Progressing: [] Met: [] Not Met: [] Adjusted   - Patient will return to desired, higher level upper extremity functional activities without increased symptoms or restriction.       [x] Progressing: [] Met: [] Not Met: [] Adjusted           Overall Progression Towards Functional goals/ Treatment Progress Update:  [] Patient is progressing as expected towards functional goals listed. [] Progression is slowed due to complexities/Impairments listed. [] Progression has been slowed due to co-morbidities. [x] Plan just implemented, too soon to assess goals progression <30days   [] Goals require adjustment due to lack of progress  [] Patient is not progressing as expected and requires additional follow up with physician  [] Other    Prognosis for POC: [x] Good [] Fair  [] Poor      Patient requires continued skilled intervention: [x] Yes  [] No    Treatment/Activity Tolerance:  [x] Patient able to complete treatment  [] Patient limited by fatigue  [] Patient limited by pain    [] Patient limited by other medical complications  [] Other:         Return to Play: (if applicable)   []  Stage 1: Intro to Strength   []  Stage 2: Return to Run and Strength   []  Stage 3: Return to Jump and Strength   []  Stage 4: Dynamic Strength and Agility   []  Stage 5: Sport Specific Training     []  Ready to Return to Play, Meets All Above Stages   []  Not Ready for Return to Sports   Comments:                               PLAN: See eval  [x] Continue per plan of care [] Alter current plan (see comments above)  [] Plan of care initiated [] Hold pending MD visit [] Discharge      Electronically signed by:  Xavi Chang, PT, MPT     Note: If patient does not return for scheduled/ recommended follow up visits, this note will serve as a discharge from care along with most recent update on progress.

## 2021-12-18 NOTE — FLOWSHEET NOTE
Parag Huitron [address],  [facility], [place] [address] [employer]  [address] [facility],[address]   [place], [place]. [address]  P: [phone]  F: [phone]        Physical Therapy Treatment Note/ Progress Report:           Date:  12/15/2022  Patient Name:  Jacqueline Starks    :  1990  MRN: [id number]  Restrictions/Precautions:    Medical/Treatment Diagnosis Information:    Diagnosis: P14.275 left shoulder pain      Insurance/Certification information:     Physician Information:     Has the plan of care been signed (Y/N):        []  Yes  [x]  No     Date of Patient follow up with Physician:       Is this a Progress Report:     []  Yes  [x]  No        If Yes:  Date Range for reporting period:  Beginning  Ending    Progress report will be due (10 Rx or 30 days whichever is less):        Recertification will be due (POC Duration  / 90 days whichever is less):         Visit # Insurance Allowable Auth Required   3  []  Yes []  No        Functional Scale:    Date assessed:        Latex Allergy:  [x]NO      []YES  Preferred Language for Healthcare:   [x]English       []other:      Pain level:  nr/10     SUBJECTIVE:  \"doing better. Lawernce Roughen Lawernce Roughen Lawernce Roughen \"    OBJECTIVE:  Moderate inf capsule tightness - improved    Observation:    Test measurements:      RESTRICTIONS/PRECAUTIONS:     Exercises/Interventions:       Therapeutic Ex (97282) Sets/sec Reps Notes/CUES   Ll/ld er  5' x 2 (1#)    Supine cane @ er   4 x 20\"    Supine overhead   10 x 10\"     Supine ll/ld ir   4'     Wall walk   10\" x 5    Standing ir mulligan   6x     Seated ball roll on wedge   20x                                   Manual Intervention (65504)      Prom/stm   25'                                                                                                                                             Therapeutic Exercise and NMR EXR  [x] (28251) Provided verbal/tactile cueing for activities related to strengthening, flexibility, endurance, ROM for improvements in LE, proximal hip, and core control with self care, mobility, lifting, ambulation. [x] (67844) Provided verbal/tactile cueing for activities related to improving balance, coordination, kinesthetic sense, posture, motor skill, proprioception to assist with LE, proximal hip, and core control in self-care, mobility, lifting, ambulation and eccentric single leg control. NMR and Therapeutic Activities:    [x] (14883 or 42465) Provided verbal/tactile cueing for activities related to improving balance, coordination, kinesthetic sense, posture, motor skill, proprioception and motor activation to allow for proper function of core, proximal hip and LE with self-care and ADLs and functional mobility.   [] (42979) Gait Re-education- Provided training and instruction to the patient for proper LE, core and proximal hip recruitment and positioning and eccentric body weight control with ambulation re-education including up and down stairs     Home Exercise Program:    [x] (84083) Reviewed/Progressed HEP activities related to strengthening, flexibility, endurance, ROM of core, proximal hip and LE for functional self-care, mobility, lifting and ambulation/stair navigation   [] (30614) Reviewed/Progressed HEP activities related to improving balance, coordination, kinesthetic sense, posture, motor skill, proprioception of core, proximal hip and LE for self-care, mobility, lifting, and ambulation/stair navigation      Manual Treatments:  PROM / STM / Oscillations-Mobs:  G-I, II, III, IV (PA's, Inf., Post.)  [x] (81885) Provided manual therapy to mobilize LE, proximal hip and/or LS spine soft tissue/joints for the purpose of modulating pain, promoting relaxation, increasing ROM, reducing/eliminating soft tissue swelling/inflammation/restriction, improving soft tissue extensibility and allowing for proper ROM for normal function with self-care, mobility, lifting and ambulation. Modalities:     [] GAME READY (VASO)- for significant edema, swelling, pain control. Charges:  Timed Code Treatment Minutes: 43'   Total Treatment Minutes: 43'       [] EVAL (LOW) 54256 (typically 20 minutes face-to-face)  [] EVAL (MOD) 55625 (typically 30 minutes face-to-face)  [] EVAL (HIGH) 46518 (typically 45 minutes face-to-face)  [] RE-EVAL     [x] SC(48778) x  1   [] IONTO  [] NMR (91914) x     [] VASO  [x] Manual (72738) x 2  [] Other:  [] TA x      [] Mech Traction (26230)  [] ES(attended) (78182)      [] ES (un) (82368):         ASSESSMENT:   Tolerated       GOALS:  Patient stated goal:     [] Progressing: [] Met: [] Not Met: [] Adjusted    Therapist goals for Patient:   Short Term Goals: To be achieved in: 2 weeks  1. Independent in HEP and progression per patient tolerance, in order to prevent re-injury. [x] Progressing: [] Met: [] Not Met: [] Adjusted   2. Patient will have a decrease in pain to facilitate improvement in movement, function, and ADLs as indicated by Functional Deficits. [x] Progressing: [] Met: [] Not Met: [] Adjusted     Long Term Goals: To be achieved in:   10 weeks  - The patient is expected to demonstrate less than 10% impairment, limitation or restriction in:  -- carrying, moving and handling objects. - Patient will demonstrate increased passive and active ROM to full, symmetrical and painfree to allow for proper joint functioning as indicated by patients Functional Deficits. [x] Progressing: [] Met: [] Not Met: [] Adjusted   - Patient will demonstrate an increase in Strength to symmetrical and painfree to allow for proper functional mobility as indicated by patients Functional Deficits. [x] Progressing: [] Met: [] Not Met: [] Adjusted   - Patient will return to desired, higher level upper extremity functional activities without increased symptoms or restriction.       [x] Progressing: [] Met: [] Not Met: [] Adjusted           Overall Progression Towards Functional goals/ Treatment Progress Update:  [] Patient is progressing as expected towards functional goals listed. [] Progression is slowed due to complexities/Impairments listed. [] Progression has been slowed due to co-morbidities. [x] Plan just implemented, too soon to assess goals progression <30days   [] Goals require adjustment due to lack of progress  [] Patient is not progressing as expected and requires additional follow up with physician  [] Other    Prognosis for POC: [x] Good [] Fair  [] Poor      Patient requires continued skilled intervention: [x] Yes  [] No    Treatment/Activity Tolerance:  [x] Patient able to complete treatment  [] Patient limited by fatigue  [] Patient limited by pain    [] Patient limited by other medical complications  [] Other:         Return to Play: (if applicable)   []  Stage 1: Intro to Strength   []  Stage 2: Return to Run and Strength   []  Stage 3: Return to Jump and Strength   []  Stage 4: Dynamic Strength and Agility   []  Stage 5: Sport Specific Training     []  Ready to Return to Play, Meets All Above Stages   []  Not Ready for Return to Sports   Comments:                               PLAN: See eval  [x] Continue per plan of care [] Alter current plan (see comments above)  [] Plan of care initiated [] Hold pending MD visit [] Discharge      Electronically signed by:  Hermila Car PT, MPT     Note: If patient does not return for scheduled/ recommended follow up visits, this note will serve as a discharge from care along with most recent update on progress.

## 2021-12-21 ENCOUNTER — HOSPITAL ENCOUNTER (OUTPATIENT)
Dept: PHYSICAL THERAPY | Age: 31
Setting detail: THERAPIES SERIES
Discharge: HOME | End: 2021-12-21
Payer: COMMERCIAL

## 2021-12-21 PROCEDURE — 97140 MANUAL THERAPY 1/> REGIONS: CPT | Performed by: PHYSICAL THERAPIST

## 2021-12-21 PROCEDURE — 97110 THERAPEUTIC EXERCISES: CPT | Performed by: PHYSICAL THERAPIST

## 2021-12-22 NOTE — FLOWSHEET NOTE
Parag Huitron [address],  Sports Performance and Rehabilitation, [facility] [address] [facility]0 [address] [place] [address]  [address] [place] [address],[address]   [place], [place]. [address]  P: [phone]  F: [phone]        Physical Therapy Treatment Note/ Progress Report:           Date:  2022  Patient Name:  Hoda Olivas    :  1990  MRN: [id number]  Restrictions/Precautions:    Medical/Treatment Diagnosis Information:    Diagnosis: X65.653 left shoulder pain      Insurance/Certification information:     Physician Information:     Has the plan of care been signed (Y/N):        []  Yes  [x]  No     Date of Patient follow up with Physician:       Is this a Progress Report:     []  Yes  [x]  No        If Yes:  Date Range for reporting period:  Beginning  Ending    Progress report will be due (10 Rx or 30 days whichever is less):        Recertification will be due (POC Duration  / 90 days whichever is less):         Visit # Insurance Allowable Auth Required   5  []  Yes []  No        Functional Scale:    Date assessed:        Latex Allergy:  [x]NO      []YES  Preferred Language for Healthcare:   [x]English       []other:      Pain level:  nr/10     SUBJECTIVE:  \"I am feeling better. ...  \"    OBJECTIVE:      Observation:     Test measurements:      RESTRICTIONS/PRECAUTIONS:     Exercises/Interventions:       Therapeutic Ex (82138) Sets/sec Reps Notes/CUES   Ll/ld er; ir   5' each (1#)    Supine cane @ er   4 x 20\"    Supine overhead   10 x 10\"     Supine ll/ld pec   45\" x 3    Wall walk   10\" x 5    Standing ir mulvandanagan   6x     Seated ball roll on wedge   30x with resist     theraband rows  Blue                             Manual Intervention ([id number])      Prom/stm   25'                                                                                                                                             Therapeutic Exercise and NMR EXR  [x] (11785) Provided verbal/tactile cueing for activities related to strengthening, flexibility, endurance, ROM for improvements in LE, proximal hip, and core control with self care, mobility, lifting, ambulation. [x] (95840) Provided verbal/tactile cueing for activities related to improving balance, coordination, kinesthetic sense, posture, motor skill, proprioception to assist with LE, proximal hip, and core control in self-care, mobility, lifting, ambulation and eccentric single leg control.      NMR and Therapeutic Activities:    [x] (75960 or 83887) Provided verbal/tactile cueing for activities related to improving balance, coordination, kinesthetic sense, posture, motor skill, proprioception and motor activation to allow for proper function of core, proximal hip and LE with self-care and ADLs and functional mobility.   [] (17949) Gait Re-education- Provided training and instruction to the patient for proper LE, core and proximal hip recruitment and positioning and eccentric body weight control with ambulation re-education including up and down stairs     Home Exercise Program:    [x] (14468) Reviewed/Progressed HEP activities related to strengthening, flexibility, endurance, ROM of core, proximal hip and LE for functional self-care, mobility, lifting and ambulation/stair navigation   [] (63280) Reviewed/Progressed HEP activities related to improving balance, coordination, kinesthetic sense, posture, motor skill, proprioception of core, proximal hip and LE for self-care, mobility, lifting, and ambulation/stair navigation      Manual Treatments:  PROM / STM / Oscillations-Mobs:  G-I, II, III, IV (PA's, Inf., Post.)  [x] (98826) Provided manual therapy to mobilize LE, proximal hip and/or LS spine soft tissue/joints for the purpose of modulating pain, promoting relaxation, increasing ROM, reducing/eliminating soft tissue swelling/inflammation/restriction, improving soft tissue extensibility and allowing for proper ROM for normal function with self-care, mobility, lifting and ambulation. Modalities:     [] GAME READY (VASO)- for significant edema, swelling, pain control. Charges:  Timed Code Treatment Minutes: 43'   Total Treatment Minutes: 43'       [] EVAL (LOW) 51535 (typically 20 minutes face-to-face)  [] EVAL (MOD) 46459 (typically 30 minutes face-to-face)  [] EVAL (HIGH) 17600 (typically 45 minutes face-to-face)  [] RE-EVAL     [x] AO(47573) x  1   [] IONTO  [] NMR (71853)   [] VASO  [x] Manual (51938) x 2  [] Other:  [] TA x      [] Mech Traction (19999)  [] ES(attended) (60818)      [] ES (un) (33358):         ASSESSMENT:   Tolerated       GOALS:  Patient stated goal:     [] Progressing: [] Met: [] Not Met: [] Adjusted    Therapist goals for Patient:   Short Term Goals: To be achieved in: 2 weeks  1. Independent in HEP and progression per patient tolerance, in order to prevent re-injury. [x] Progressing: [] Met: [] Not Met: [] Adjusted   2. Patient will have a decrease in pain to facilitate improvement in movement, function, and ADLs as indicated by Functional Deficits. [x] Progressing: [] Met: [] Not Met: [] Adjusted     Long Term Goals: To be achieved in:   10 weeks  - The patient is expected to demonstrate less than 10% impairment, limitation or restriction in:  -- carrying, moving and handling objects. - Patient will demonstrate increased passive and active ROM to full, symmetrical and painfree to allow for proper joint functioning as indicated by patients Functional Deficits. [x] Progressing: [] Met: [] Not Met: [] Adjusted   - Patient will demonstrate an increase in Strength to symmetrical and painfree to allow for proper functional mobility as indicated by patients Functional Deficits. [x] Progressing: [] Met: [] Not Met: [] Adjusted   - Patient will return to desired, higher level upper extremity functional activities without increased symptoms or restriction.       [x] Progressing: [] Met: [] Not Met: [] Adjusted           Overall Progression Towards Functional goals/ Treatment Progress Update:  [] Patient is progressing as expected towards functional goals listed. [] Progression is slowed due to complexities/Impairments listed. [] Progression has been slowed due to co-morbidities. [x] Plan just implemented, too soon to assess goals progression <30days   [] Goals require adjustment due to lack of progress  [] Patient is not progressing as expected and requires additional follow up with physician  [] Other    Prognosis for POC: [x] Good [] Fair  [] Poor      Patient requires continued skilled intervention: [x] Yes  [] No    Treatment/Activity Tolerance:  [x] Patient able to complete treatment  [] Patient limited by fatigue  [] Patient limited by pain    [] Patient limited by other medical complications  [] Other:         Return to Play: (if applicable)   []  Stage 1: Intro to Strength   []  Stage 2: Return to Run and Strength   []  Stage 3: Return to Jump and Strength   []  Stage 4: Dynamic Strength and Agility   []  Stage 5: Sport Specific Training     []  Ready to Return to Play, Meets All Above Stages   []  Not Ready for Return to Sports   Comments:                               PLAN: See eval  [x] Continue per plan of care [] Alter current plan (see comments above)  [] Plan of care initiated [] Hold pending MD visit [] Discharge      Electronically signed by:  Brittaney Agarwal PT, MPT     Note: If patient does not return for scheduled/ recommended follow up visits, this note will serve as a discharge from care along with most recent update on progress.

## 2021-12-23 ENCOUNTER — HOSPITAL ENCOUNTER (OUTPATIENT)
Dept: PHYSICAL THERAPY | Age: 31
Setting detail: THERAPIES SERIES
Discharge: HOME | End: 2021-12-23
Payer: COMMERCIAL

## 2021-12-23 PROCEDURE — 97140 MANUAL THERAPY 1/> REGIONS: CPT | Performed by: PHYSICAL THERAPIST

## 2021-12-24 NOTE — FLOWSHEET NOTE
The [address] [facility] and [address],  Sports Performance and Rehabilitation, [facility] [address]99 [address]  [address] [facility],5[address]   [place], [place]  Phone: [phone]  Fax: [phone]      Physical Therapy Treatment Note/ Progress Report:           Date:  2022  Patient Name:  Dante Santiago    :  1990  MRN: [id number]  Restrictions/Precautions:    Medical/Treatment Diagnosis Information:    Diagnosis: R27.548 left shoulder pain      Insurance/Certification information:     Physician Information:     Has the plan of care been signed (Y/N):        []  Yes  [x]  No     Date of Patient follow up with Physician:       Is this a Progress Report:     []  Yes  [x]  No        If Yes:  Date Range for reporting period:  Beginning  Ending    Progress report will be due (10 Rx or 30 days whichever is less):        Recertification will be due (POC Duration  / 90 days whichever is less):         Visit # Insurance Allowable Auth Required   6  []  Yes []  No        Functional Scale:    Date assessed:        Latex Allergy:  [x]NO      []YES  Preferred Language for Healthcare:   [x]English       []other:      Pain level:  nr/10     SUBJECTIVE:  \"I am feeling better. ...  \"    OBJECTIVE:      Observation:  Mod inf capsule tightness; 4/5 safe zone; 4-/5 scapula    Test measurements:      RESTRICTIONS/PRECAUTIONS:     Exercises/Interventions:       Therapeutic Ex (43051) Sets/sec Reps Notes/CUES   Ll/ld er; ir   5'  (1#)    Supine cane @ er   4 x 20\"    Supine overhead   10 x 10\"     Supine ll/ld pec       Wall walk   10\" x 5    Standing ir mulligan       Seated ball roll on wedge   30x with resist     theraband rows  Blue     Quadruped fwd rock   20x    Supine punches   30x mr    Standing scaptions   30x     Ball push up with +    sidelying er     20x    20x     Manual Intervention (96838)      Prom/stm   25' tissue swelling/inflammation/restriction, improving soft tissue extensibility and allowing for proper ROM for normal function with self-care, mobility, lifting and ambulation. Modalities:     [] GAME READY (VASO)- for significant edema, swelling, pain control. Charges:  Timed Code Treatment Minutes: 27' (one on one)   Total Treatment Minutes: 30'       [] EVAL (LOW) 05689 (typically 20 minutes face-to-face)  [] EVAL (MOD) 23389 (typically 30 minutes face-to-face)  [] EVAL (HIGH) 76119 (typically 45 minutes face-to-face)  [] RE-EVAL     [] VL(65392) x  1   [] IONTO  [] NMR (96400)   [] VASO  [x] Manual (55704) x 2  [] Other:  [] TA x      [] Mech Traction (31342)  [] ES(attended) (18575)      [] ES (un) (59180):         ASSESSMENT:   Tolerated       GOALS:  Patient stated goal:     [] Progressing: [] Met: [] Not Met: [] Adjusted    Therapist goals for Patient:   Short Term Goals: To be achieved in: 2 weeks  1. Independent in HEP and progression per patient tolerance, in order to prevent re-injury. [x] Progressing: [] Met: [] Not Met: [] Adjusted   2. Patient will have a decrease in pain to facilitate improvement in movement, function, and ADLs as indicated by Functional Deficits. [x] Progressing: [] Met: [] Not Met: [] Adjusted     Long Term Goals: To be achieved in:   10 weeks  - The patient is expected to demonstrate less than 10% impairment, limitation or restriction in:  -- carrying, moving and handling objects. - Patient will demonstrate increased passive and active ROM to full, symmetrical and painfree to allow for proper joint functioning as indicated by patients Functional Deficits. [x] Progressing: [] Met: [] Not Met: [] Adjusted   - Patient will demonstrate an increase in Strength to symmetrical and painfree to allow for proper functional mobility as indicated by patients Functional Deficits.    [x] Progressing: [] Met: [] Not Met: [] Adjusted   - Patient will return to desired, higher level upper extremity functional activities without increased symptoms or restriction. [x] Progressing: [] Met: [] Not Met: [] Adjusted           Overall Progression Towards Functional goals/ Treatment Progress Update:  [] Patient is progressing as expected towards functional goals listed. [] Progression is slowed due to complexities/Impairments listed. [] Progression has been slowed due to co-morbidities. [x] Plan just implemented, too soon to assess goals progression <30days   [] Goals require adjustment due to lack of progress  [] Patient is not progressing as expected and requires additional follow up with physician  [] Other    Prognosis for POC: [x] Good [] Fair  [] Poor      Patient requires continued skilled intervention: [x] Yes  [] No    Treatment/Activity Tolerance:  [x] Patient able to complete treatment  [] Patient limited by fatigue  [] Patient limited by pain    [] Patient limited by other medical complications  [] Other:         Return to Play: (if applicable)   []  Stage 1: Intro to Strength   []  Stage 2: Return to Run and Strength   []  Stage 3: Return to Jump and Strength   []  Stage 4: Dynamic Strength and Agility   []  Stage 5: Sport Specific Training     []  Ready to Return to Play, Meets All Above Stages   []  Not Ready for Return to Sports   Comments:                               PLAN: See eval  [x] Continue per plan of care [] Alter current plan (see comments above)  [] Plan of care initiated [] Hold pending MD visit [] Discharge      Electronically signed by:  Miguel Gutierrez, PT, MPT     Note: If patient does not return for scheduled/ recommended follow up visits, this note will serve as a discharge from care along with most recent update on progress.

## 2021-12-27 ENCOUNTER — APPOINTMENT (OUTPATIENT)
Dept: PHYSICAL THERAPY | Age: 31
End: 2021-12-27
Payer: COMMERCIAL

## 2021-12-29 ENCOUNTER — HOSPITAL ENCOUNTER (OUTPATIENT)
Dept: PHYSICAL THERAPY | Age: 31
Setting detail: THERAPIES SERIES
Discharge: HOME | End: 2021-12-29
Payer: COMMERCIAL

## 2021-12-29 PROCEDURE — 97140 MANUAL THERAPY 1/> REGIONS: CPT | Performed by: PHYSICAL THERAPIST

## 2021-12-29 PROCEDURE — 97110 THERAPEUTIC EXERCISES: CPT | Performed by: PHYSICAL THERAPIST

## 2021-12-30 ENCOUNTER — HOSPITAL ENCOUNTER (OUTPATIENT)
Dept: PHYSICAL THERAPY | Age: 31
Setting detail: THERAPIES SERIES
Discharge: HOME | End: 2021-12-30
Payer: COMMERCIAL

## 2021-12-30 PROCEDURE — 97140 MANUAL THERAPY 1/> REGIONS: CPT | Performed by: PHYSICAL THERAPIST

## 2021-12-30 PROCEDURE — 97112 NEUROMUSCULAR REEDUCATION: CPT | Performed by: PHYSICAL THERAPIST

## 2021-12-31 NOTE — FLOWSHEET NOTE
The [address] [facility] and [address],  Sports Performance and Rehabilitation, [facility] [address]199 [address]  793 [facility],5[address] Floor   [place], [address]  Phone: [phone]  Fax: [phone]      Physical Therapy Treatment Note/ Progress Report:           Date:  2021  Patient Name:  Dante Santiago    :  1990  MRN: [id number]  Restrictions/Precautions:    Medical/Treatment Diagnosis Information:    Diagnosis: R58.625 left shoulder pain      Insurance/Certification information:     Physician Information:     Has the plan of care been signed (Y/N):        []  Yes  [x]  No     Date of Patient follow up with Physician:       Is this a Progress Report:     []  Yes  [x]  No        If Yes:  Date Range for reporting period:  Beginning  Ending    Progress report will be due (10 Rx or 30 days whichever is less):        Recertification will be due (POC Duration  / 90 days whichever is less):         Visit # Insurance Allowable Auth Required   7  []  Yes []  No        Functional Scale:    Date assessed:        Latex Allergy:  [x]NO      []YES  Preferred Language for Healthcare:   [x]English       []other:      Pain level:  nr/10     SUBJECTIVE:  \"I am moving my arm better. Leandro Gutter Leandro Gutter \"    OBJECTIVE:      Observation:  Mod inf capsule tightness; 4/5 safe zone; 4-/5 scapula    Test measurements:      RESTRICTIONS/PRECAUTIONS:     Exercises/Interventions:       Therapeutic Ex (27002) Sets/sec Reps Notes/CUES   Ll/ld er; ir   5'  (1#)    Supine cane @ er   4 x 20\"    Supine overhead   10 x 10\"     Supine ll/ld pec       Wall walk   10\" x 5    Standing ir mulligan       Seated ball roll on wedge   30x with resist     theraband rows  Blue     Quadruped fwd rock   20x    Supine punches   30x mr    Standing scaptions   30x     Ball push up with +    sidelying er          20x    20x     Manual Intervention (91905)      Prom/stm   25' Therapeutic Exercise and NMR EXR  [x] (88529) Provided verbal/tactile cueing for activities related to strengthening, flexibility, endurance, ROM for improvements in LE, proximal hip, and core control with self care, mobility, lifting, ambulation. [x] (32012) Provided verbal/tactile cueing for activities related to improving balance, coordination, kinesthetic sense, posture, motor skill, proprioception to assist with LE, proximal hip, and core control in self-care, mobility, lifting, ambulation and eccentric single leg control.      NMR and Therapeutic Activities:    [x] (95529 or 92564) Provided verbal/tactile cueing for activities related to improving balance, coordination, kinesthetic sense, posture, motor skill, proprioception and motor activation to allow for proper function of core, proximal hip and LE with self-care and ADLs and functional mobility.   [] (04757) Gait Re-education- Provided training and instruction to the patient for proper LE, core and proximal hip recruitment and positioning and eccentric body weight control with ambulation re-education including up and down stairs     Home Exercise Program:    [x] (40985) Reviewed/Progressed HEP activities related to strengthening, flexibility, endurance, ROM of core, proximal hip and LE for functional self-care, mobility, lifting and ambulation/stair navigation   [] (25948) Reviewed/Progressed HEP activities related to improving balance, coordination, kinesthetic sense, posture, motor skill, proprioception of core, proximal hip and LE for self-care, mobility, lifting, and ambulation/stair navigation      Manual Treatments:  PROM / STM / Oscillations-Mobs:  G-I, II, III, IV (PA's, Inf., Post.)  [x] (27784) Provided manual therapy to mobilize LE, proximal hip and/or LS spine soft tissue/joints for the purpose of modulating pain, promoting relaxation, increasing ROM, reducing/eliminating soft tissue swelling/inflammation/restriction, improving soft tissue extensibility and allowing for proper ROM for normal function with self-care, mobility, lifting and ambulation. Modalities:     [] GAME READY (VASO)- for significant edema, swelling, pain control. Charges:  Timed Code Treatment Minutes: 39'    Total Treatment Minutes: 39'       [] EVAL (LOW) 73518 (typically 20 minutes face-to-face)  [] EVAL (MOD) 88835 (typically 30 minutes face-to-face)  [] EVAL (HIGH) 18366 (typically 45 minutes face-to-face)  [] RE-EVAL     [x] IJ(34276) x  1   [] IONTO  [] NMR (45622)   [] VASO  [x] Manual (82881) x 2  [] Other:  [] TA x      [] Mech Traction (60455)  [] ES(attended) (39723)      [] ES (un) (88765):         ASSESSMENT:   Tolerated       GOALS:  Patient stated goal:     [] Progressing: [] Met: [] Not Met: [] Adjusted    Therapist goals for Patient:   Short Term Goals: To be achieved in: 2 weeks  1. Independent in HEP and progression per patient tolerance, in order to prevent re-injury. [x] Progressing: [] Met: [] Not Met: [] Adjusted   2. Patient will have a decrease in pain to facilitate improvement in movement, function, and ADLs as indicated by Functional Deficits. [x] Progressing: [] Met: [] Not Met: [] Adjusted     Long Term Goals: To be achieved in:   10 weeks  - The patient is expected to demonstrate less than 10% impairment, limitation or restriction in:  -- carrying, moving and handling objects. - Patient will demonstrate increased passive and active ROM to full, symmetrical and painfree to allow for proper joint functioning as indicated by patients Functional Deficits. [x] Progressing: [] Met: [] Not Met: [] Adjusted   - Patient will demonstrate an increase in Strength to symmetrical and painfree to allow for proper functional mobility as indicated by patients Functional Deficits.    [x] Progressing: [] Met: [] Not Met: [] Adjusted   - Patient will return to desired, higher level upper extremity functional activities without increased symptoms or restriction. [x] Progressing: [] Met: [] Not Met: [] Adjusted           Overall Progression Towards Functional goals/ Treatment Progress Update:  [] Patient is progressing as expected towards functional goals listed. [] Progression is slowed due to complexities/Impairments listed. [] Progression has been slowed due to co-morbidities. [x] Plan just implemented, too soon to assess goals progression <30days   [] Goals require adjustment due to lack of progress  [] Patient is not progressing as expected and requires additional follow up with physician  [] Other    Prognosis for POC: [x] Good [] Fair  [] Poor      Patient requires continued skilled intervention: [x] Yes  [] No    Treatment/Activity Tolerance:  [x] Patient able to complete treatment  [] Patient limited by fatigue  [] Patient limited by pain    [] Patient limited by other medical complications  [] Other:         Return to Play: (if applicable)   []  Stage 1: Intro to Strength   []  Stage 2: Return to Run and Strength   []  Stage 3: Return to Jump and Strength   []  Stage 4: Dynamic Strength and Agility   []  Stage 5: Sport Specific Training     []  Ready to Return to Play, Meets All Above Stages   []  Not Ready for Return to Sports   Comments:                               PLAN: See eval  [x] Continue per plan of care [] Alter current plan (see comments above)  [] Plan of care initiated [] Hold pending MD visit [] Discharge      Electronically signed by:  Melanie Rosa PT, MPT     Note: If patient does not return for scheduled/ recommended follow up visits, this note will serve as a discharge from care along with most recent update on progress.

## 2022-01-01 NOTE — FLOWSHEET NOTE
Therapeutic Exercise and NMR EXR  [x] (99181) Provided verbal/tactile cueing for activities related to strengthening, flexibility, endurance, ROM for improvements in LE, proximal hip, and core control with self care, mobility, lifting, ambulation. [x] (56121) Provided verbal/tactile cueing for activities related to improving balance, coordination, kinesthetic sense, posture, motor skill, proprioception to assist with LE, proximal hip, and core control in self-care, mobility, lifting, ambulation and eccentric single leg control.      NMR and Therapeutic Activities:    [x] (59939 or 67236) Provided verbal/tactile cueing for activities related to improving balance, coordination, kinesthetic sense, posture, motor skill, proprioception and motor activation to allow for proper function of core, proximal hip and LE with self-care and ADLs and functional mobility.   [] (58856) Gait Re-education- Provided training and instruction to the patient for proper LE, core and proximal hip recruitment and positioning and eccentric body weight control with ambulation re-education including up and down stairs     Home Exercise Program:    [x] (26725) Reviewed/Progressed HEP activities related to strengthening, flexibility, endurance, ROM of core, proximal hip and LE for functional self-care, mobility, lifting and ambulation/stair navigation   [] (59870) Reviewed/Progressed HEP activities related to improving balance, coordination, kinesthetic sense, posture, motor skill, proprioception of core, proximal hip and LE for self-care, mobility, lifting, and ambulation/stair navigation      Manual Treatments:  PROM / STM / Oscillations-Mobs:  G-I, II, III, IV (PA's, Inf., Post.)  [x] (98547) Provided manual therapy to mobilize LE, proximal hip and/or LS spine soft tissue/joints for the purpose of modulating pain, promoting relaxation, increasing ROM, reducing/eliminating soft tissue swelling/inflammation/restriction, improving soft tissue extensibility and allowing for proper ROM for normal function with self-care, mobility, lifting and ambulation. Modalities:     [] GAME READY (VASO)- for significant edema, swelling, pain control. Charges:  Timed Code Treatment Minutes: 39'    Total Treatment Minutes: 39'       [] EVAL (LOW) 83128 (typically 20 minutes face-to-face)  [] EVAL (MOD) 74950 (typically 30 minutes face-to-face)  [] EVAL (HIGH) 85658 (typically 45 minutes face-to-face)  [] RE-EVAL     [x] XV(89921) x  1   [] IONTO  [] NMR (99978)   [] VASO  [x] Manual (49142) x 2  [] Other:  [] TA x      [] Mech Traction (80919)  [] ES(attended) (42333)      [] ES (un) (80487):         ASSESSMENT:   Tolerated       GOALS:  Patient stated goal:     [] Progressing: [] Met: [] Not Met: [] Adjusted    Therapist goals for Patient:   Short Term Goals: To be achieved in: 2 weeks  1. Independent in HEP and progression per patient tolerance, in order to prevent re-injury. [x] Progressing: [] Met: [] Not Met: [] Adjusted   2. Patient will have a decrease in pain to facilitate improvement in movement, function, and ADLs as indicated by Functional Deficits. [x] Progressing: [] Met: [] Not Met: [] Adjusted     Long Term Goals: To be achieved in:   10 weeks  - The patient is expected to demonstrate less than 10% impairment, limitation or restriction in:  -- carrying, moving and handling objects. - Patient will demonstrate increased passive and active ROM to full, symmetrical and painfree to allow for proper joint functioning as indicated by patients Functional Deficits. [x] Progressing: [] Met: [] Not Met: [] Adjusted   - Patient will demonstrate an increase in Strength to symmetrical and painfree to allow for proper functional mobility as indicated by patients Functional Deficits.    [x] Progressing: [] Met: [] Not Met: [] Adjusted   - Patient will return to desired, higher level upper extremity functional activities without increased symptoms or restriction. [x] Progressing: [] Met: [] Not Met: [] Adjusted           Overall Progression Towards Functional goals/ Treatment Progress Update:  [] Patient is progressing as expected towards functional goals listed. [] Progression is slowed due to complexities/Impairments listed. [] Progression has been slowed due to co-morbidities. [x] Plan just implemented, too soon to assess goals progression <30days   [] Goals require adjustment due to lack of progress  [] Patient is not progressing as expected and requires additional follow up with physician  [] Other    Prognosis for POC: [x] Good [] Fair  [] Poor      Patient requires continued skilled intervention: [x] Yes  [] No    Treatment/Activity Tolerance:  [x] Patient able to complete treatment  [] Patient limited by fatigue  [] Patient limited by pain    [] Patient limited by other medical complications  [] Other:         Return to Play: (if applicable)   []  Stage 1: Intro to Strength   []  Stage 2: Return to Run and Strength   []  Stage 3: Return to Jump and Strength   []  Stage 4: Dynamic Strength and Agility   []  Stage 5: Sport Specific Training     []  Ready to Return to Play, Meets All Above Stages   []  Not Ready for Return to Sports   Comments:                               PLAN: See eval  [x] Continue per plan of care [] Alter current plan (see comments above)  [] Plan of care initiated [] Hold pending MD visit [] Discharge      Electronically signed by:  Deedee Bosch PT, MPT     Note: If patient does not return for scheduled/ recommended follow up visits, this note will serve as a discharge from care along with most recent update on progress.

## 2022-01-03 ENCOUNTER — HOSPITAL ENCOUNTER (OUTPATIENT)
Dept: PHYSICAL THERAPY | Age: 32
Setting detail: THERAPIES SERIES
Discharge: HOME | End: 2022-01-03
Payer: COMMERCIAL

## 2022-01-03 PROCEDURE — 97140 MANUAL THERAPY 1/> REGIONS: CPT | Performed by: PHYSICAL THERAPIST

## 2022-01-03 PROCEDURE — 97110 THERAPEUTIC EXERCISES: CPT | Performed by: PHYSICAL THERAPIST

## 2022-01-05 ENCOUNTER — HOSPITAL ENCOUNTER (OUTPATIENT)
Dept: PHYSICAL THERAPY | Age: 32
Setting detail: THERAPIES SERIES
Discharge: HOME | End: 2022-01-05
Payer: COMMERCIAL

## 2022-01-05 PROCEDURE — 97140 MANUAL THERAPY 1/> REGIONS: CPT | Performed by: PHYSICAL THERAPIST

## 2022-01-08 NOTE — FLOWSHEET NOTE
The [facility] and [address],  [facility], [facility] [address]  [address] [facility],5[address]   [place], [place]  Phone: [phone]  Fax: [phone]      Physical Therapy Treatment Note/ Progress Report:           Date:  1/3/2022  Patient Name:  Courtney Cushing    :  1990  MRN: [id number]  Restrictions/Precautions:    Medical/Treatment Diagnosis Information:    Diagnosis: V16.901 left shoulder pain      Insurance/Certification information:     Physician Information:     Has the plan of care been signed (Y/N):        []  Yes  [x]  No     Date of Patient follow up with Physician:       Is this a Progress Report:     []  Yes  [x]  No        If Yes:  Date Range for reporting period:  Beginning  Ending    Progress report will be due (10 Rx or 30 days whichever is less):        Recertification will be due (POC Duration  / 90 days whichever is less):         Visit # Insurance Allowable Auth Required   8  []  Yes []  No        Functional Scale:    Date assessed:        Latex Allergy:  [x]NO      []YES  Preferred Language for Healthcare:   [x]English       []other:      Pain level:  nr/10     SUBJECTIVE:  \"I am getting more strength\"    OBJECTIVE:      Observation:  Mod inf capsule tightness; 4/5 safe zone; 4-/5 scapula    Test measurements:      RESTRICTIONS/PRECAUTIONS:     Exercises/Interventions:       Therapeutic Ex (92706) Sets/sec Reps Notes/CUES   Ll/ld er; ir   5'  (1#)    Supine cane @ er   4 x 20\"    Supine overhead   10 x 10\"     Supine ll/ld pec       Wall walk   10\" x 5    Standing ir mulligan       Seated ball roll on wedge   30x with resist     theraband rows  Blue     Quadruped fwd rock   20x    Supine punches   30x mr    Standing scaptions   30x     Ball push up with +    sidelying er          20x    20x     Manual Intervention ([address] [facility])      Prom/stm   25' Therapeutic Exercise and NMR EXR  [x] (68272) Provided verbal/tactile cueing for activities related to strengthening, flexibility, endurance, ROM for improvements in LE, proximal hip, and core control with self care, mobility, lifting, ambulation. [x] (88951) Provided verbal/tactile cueing for activities related to improving balance, coordination, kinesthetic sense, posture, motor skill, proprioception to assist with LE, proximal hip, and core control in self-care, mobility, lifting, ambulation and eccentric single leg control.      NMR and Therapeutic Activities:    [x] (43280 or 59465) Provided verbal/tactile cueing for activities related to improving balance, coordination, kinesthetic sense, posture, motor skill, proprioception and motor activation to allow for proper function of core, proximal hip and LE with self-care and ADLs and functional mobility.   [] (19135) Gait Re-education- Provided training and instruction to the patient for proper LE, core and proximal hip recruitment and positioning and eccentric body weight control with ambulation re-education including up and down stairs     Home Exercise Program:    [x] (75642) Reviewed/Progressed HEP activities related to strengthening, flexibility, endurance, ROM of core, proximal hip and LE for functional self-care, mobility, lifting and ambulation/stair navigation   [] (51454) Reviewed/Progressed HEP activities related to improving balance, coordination, kinesthetic sense, posture, motor skill, proprioception of core, proximal hip and LE for self-care, mobility, lifting, and ambulation/stair navigation      Manual Treatments:  PROM / STM / Oscillations-Mobs:  G-I, II, III, IV (PA's, Inf., Post.)  [x] (62774) Provided manual therapy to mobilize LE, proximal hip and/or LS spine soft tissue/joints for the purpose of modulating pain, promoting relaxation, increasing ROM, reducing/eliminating soft tissue swelling/inflammation/restriction, improving soft tissue extensibility and allowing for proper ROM for normal function with self-care, mobility, lifting and ambulation. Modalities:     [] GAME READY (VASO)- for significant edema, swelling, pain control. Charges:  Timed Code Treatment Minutes: 35'    Total Treatment Minutes: 28'       [] EVAL (LOW) 71690 (typically 20 minutes face-to-face)  [] EVAL (MOD) 77246 (typically 30 minutes face-to-face)  [] EVAL (HIGH) 54366 (typically 45 minutes face-to-face)  [] RE-EVAL     [] OX(76462)   [] IONTO  [] NMR (68627)   [] VASO  [x] Manual (68744) x 2  [] Other:  [] TA x      [] Mech Traction (93146)  [] ES(attended) (65489)      [] ES (un) (00679):         ASSESSMENT:   Tolerated       GOALS:  Patient stated goal:     [] Progressing: [] Met: [] Not Met: [] Adjusted    Therapist goals for Patient:   Short Term Goals: To be achieved in: 2 weeks  1. Independent in HEP and progression per patient tolerance, in order to prevent re-injury. [x] Progressing: [] Met: [] Not Met: [] Adjusted   2. Patient will have a decrease in pain to facilitate improvement in movement, function, and ADLs as indicated by Functional Deficits. [x] Progressing: [] Met: [] Not Met: [] Adjusted     Long Term Goals: To be achieved in:   10 weeks  - The patient is expected to demonstrate less than 10% impairment, limitation or restriction in:  -- carrying, moving and handling objects. - Patient will demonstrate increased passive and active ROM to full, symmetrical and painfree to allow for proper joint functioning as indicated by patients Functional Deficits. [x] Progressing: [] Met: [] Not Met: [] Adjusted   - Patient will demonstrate an increase in Strength to symmetrical and painfree to allow for proper functional mobility as indicated by patients Functional Deficits.    [x] Progressing: [] Met: [] Not Met: [] Adjusted   - Patient will return to desired, higher level upper extremity functional activities without increased symptoms or restriction. [x] Progressing: [] Met: [] Not Met: [] Adjusted           Overall Progression Towards Functional goals/ Treatment Progress Update:  [] Patient is progressing as expected towards functional goals listed. [] Progression is slowed due to complexities/Impairments listed. [] Progression has been slowed due to co-morbidities. [x] Plan just implemented, too soon to assess goals progression <30days   [] Goals require adjustment due to lack of progress  [] Patient is not progressing as expected and requires additional follow up with physician  [] Other    Prognosis for POC: [x] Good [] Fair  [] Poor      Patient requires continued skilled intervention: [x] Yes  [] No    Treatment/Activity Tolerance:  [x] Patient able to complete treatment  [] Patient limited by fatigue  [] Patient limited by pain    [] Patient limited by other medical complications  [] Other:         Return to Play: (if applicable)   []  Stage 1: Intro to Strength   []  Stage 2: Return to Run and Strength   []  Stage 3: Return to Jump and Strength   []  Stage 4: Dynamic Strength and Agility   []  Stage 5: Sport Specific Training     []  Ready to Return to Play, Meets All Above Stages   []  Not Ready for Return to Sports   Comments:                               PLAN: See eval  [x] Continue per plan of care [] Alter current plan (see comments above)  [] Plan of care initiated [] Hold pending MD visit [] Discharge      Electronically signed by:  Trinidad Gamboa PT, MPT     Note: If patient does not return for scheduled/ recommended follow up visits, this note will serve as a discharge from care along with most recent update on progress.

## 2022-01-09 NOTE — FLOWSHEET NOTE
The [facility] and [address],  Sports [facility] and Rehabilitation, [facility] [address]  [address] [facility],[address]   [place]  Phone: [phone]  Fax: [phone]      Physical Therapy Treatment Note/ Progress Report:           Date:  2022  Patient Name:  Ross George    :  1990  MRN: [id number]  Restrictions/Precautions:    Medical/Treatment Diagnosis Information:    Diagnosis: J95.678 left shoulder pain      Insurance/Certification information:     Physician Information:     Has the plan of care been signed (Y/N):        []  Yes  [x]  No     Date of Patient follow up with Physician:       Is this a Progress Report:     []  Yes  [x]  No        If Yes:  Date Range for reporting period:  Beginning  Ending    Progress report will be due (10 Rx or 30 days whichever is less):        Recertification will be due (POC Duration  / 90 days whichever is less):         Visit # Insurance Allowable Auth Required   9  []  Yes []  No        Functional Scale:    Date assessed:        Latex Allergy:  [x]NO      []YES  Preferred Language for Healthcare:   [x]English       []other:      Pain level:  nr/10     SUBJECTIVE:  \"I am doing better\"    OBJECTIVE:      Observation:  Mod inf capsule tightness; 4/5 safe zone; 4-/5 scapula    Test measurements:      RESTRICTIONS/PRECAUTIONS:     Exercises/Interventions:       Therapeutic Ex (27205) Sets/sec Reps Notes/CUES   Ll/ld er; ir   5'  (1#)    Supine cane @ er   4 x 20\"    Supine overhead   10 x 10\"     Supine ll/ld pec       Wall walk   10\" x 5    Standing ir mulligan       Seated ball roll on wedge   30x with resist     theraband rows  Blue     Quadruped fwd rock   20x    Supine punches   30x mr    Standing scaptions   30x     Ball push up with +    sidelying er          20x    20x     Manual Intervention ([id number])      Prom/stm   25' Therapeutic Exercise and NMR EXR  [x] (20875) Provided verbal/tactile cueing for activities related to strengthening, flexibility, endurance, ROM for improvements in LE, proximal hip, and core control with self care, mobility, lifting, ambulation. [x] (44481) Provided verbal/tactile cueing for activities related to improving balance, coordination, kinesthetic sense, posture, motor skill, proprioception to assist with LE, proximal hip, and core control in self-care, mobility, lifting, ambulation and eccentric single leg control.      NMR and Therapeutic Activities:    [x] (42185 or 12717) Provided verbal/tactile cueing for activities related to improving balance, coordination, kinesthetic sense, posture, motor skill, proprioception and motor activation to allow for proper function of core, proximal hip and LE with self-care and ADLs and functional mobility.   [] (29661) Gait Re-education- Provided training and instruction to the patient for proper LE, core and proximal hip recruitment and positioning and eccentric body weight control with ambulation re-education including up and down stairs     Home Exercise Program:    [x] (47635) Reviewed/Progressed HEP activities related to strengthening, flexibility, endurance, ROM of core, proximal hip and LE for functional self-care, mobility, lifting and ambulation/stair navigation   [] (43578) Reviewed/Progressed HEP activities related to improving balance, coordination, kinesthetic sense, posture, motor skill, proprioception of core, proximal hip and LE for self-care, mobility, lifting, and ambulation/stair navigation      Manual Treatments:  PROM / STM / Oscillations-Mobs:  G-I, II, III, IV (PA's, Inf., Post.)  [x] (53414) Provided manual therapy to mobilize LE, proximal hip and/or LS spine soft tissue/joints for the purpose of modulating pain, promoting relaxation, increasing ROM, reducing/eliminating soft tissue swelling/inflammation/restriction, improving soft tissue extensibility and allowing for proper ROM for normal function with self-care, mobility, lifting and ambulation. Modalities:     [] GAME READY (VASO)- for significant edema, swelling, pain control. Charges:  Timed Code Treatment Minutes: 35'    Total Treatment Minutes: 28'       [] EVAL (LOW) 91824 (typically 20 minutes face-to-face)  [] EVAL (MOD) 80641 (typically 30 minutes face-to-face)  [] EVAL (HIGH) 36190 (typically 45 minutes face-to-face)  [] RE-EVAL     [] MN(43442)   [] IONTO  [] NMR (02449)   [] VASO  [x] Manual (58212) x 2  [] Other:  [] TA x      [] Mech Traction (03816)  [] ES(attended) (73149)      [] ES (un) (60639):         ASSESSMENT:   Tolerated       GOALS:  Patient stated goal:     [] Progressing: [] Met: [] Not Met: [] Adjusted    Therapist goals for Patient:   Short Term Goals: To be achieved in: 2 weeks  1. Independent in HEP and progression per patient tolerance, in order to prevent re-injury. [x] Progressing: [] Met: [] Not Met: [] Adjusted   2. Patient will have a decrease in pain to facilitate improvement in movement, function, and ADLs as indicated by Functional Deficits. [x] Progressing: [] Met: [] Not Met: [] Adjusted     Long Term Goals: To be achieved in:   10 weeks  - The patient is expected to demonstrate less than 10% impairment, limitation or restriction in:  -- carrying, moving and handling objects. - Patient will demonstrate increased passive and active ROM to full, symmetrical and painfree to allow for proper joint functioning as indicated by patients Functional Deficits. [x] Progressing: [] Met: [] Not Met: [] Adjusted   - Patient will demonstrate an increase in Strength to symmetrical and painfree to allow for proper functional mobility as indicated by patients Functional Deficits.    [x] Progressing: [] Met: [] Not Met: [] Adjusted   - Patient will return to desired, higher level upper extremity functional activities without increased symptoms or restriction. [x] Progressing: [] Met: [] Not Met: [] Adjusted           Overall Progression Towards Functional goals/ Treatment Progress Update:  [] Patient is progressing as expected towards functional goals listed. [] Progression is slowed due to complexities/Impairments listed. [] Progression has been slowed due to co-morbidities. [x] Plan just implemented, too soon to assess goals progression <30days   [] Goals require adjustment due to lack of progress  [] Patient is not progressing as expected and requires additional follow up with physician  [] Other    Prognosis for POC: [x] Good [] Fair  [] Poor      Patient requires continued skilled intervention: [x] Yes  [] No    Treatment/Activity Tolerance:  [x] Patient able to complete treatment  [] Patient limited by fatigue  [] Patient limited by pain    [] Patient limited by other medical complications  [] Other:         Return to Play: (if applicable)   []  Stage 1: Intro to Strength   []  Stage 2: Return to Run and Strength   []  Stage 3: Return to Jump and Strength   []  Stage 4: Dynamic Strength and Agility   []  Stage 5: Sport Specific Training     []  Ready to Return to Play, Meets All Above Stages   []  Not Ready for Return to Sports   Comments:                               PLAN: See eval  [x] Continue per plan of care [] Alter current plan (see comments above)  [] Plan of care initiated [] Hold pending MD visit [] Discharge      Electronically signed by:  Indira Norris PT, MPT     Note: If patient does not return for scheduled/ recommended follow up visits, this note will serve as a discharge from care along with most recent update on progress.

## 2022-01-11 ENCOUNTER — HOSPITAL ENCOUNTER (OUTPATIENT)
Dept: PHYSICAL THERAPY | Age: 32
Setting detail: THERAPIES SERIES
Discharge: HOME | End: 2022-01-11
Payer: COMMERCIAL

## 2022-01-11 PROCEDURE — 97140 MANUAL THERAPY 1/> REGIONS: CPT | Performed by: PHYSICAL THERAPIST

## 2022-01-11 PROCEDURE — 97110 THERAPEUTIC EXERCISES: CPT | Performed by: PHYSICAL THERAPIST

## 2022-01-14 ENCOUNTER — HOSPITAL ENCOUNTER (OUTPATIENT)
Dept: PHYSICAL THERAPY | Age: 32
Setting detail: THERAPIES SERIES
Discharge: HOME | End: 2022-01-14
Payer: COMMERCIAL

## 2022-01-14 PROCEDURE — 97140 MANUAL THERAPY 1/> REGIONS: CPT | Performed by: PHYSICAL THERAPIST

## 2022-01-15 NOTE — FLOWSHEET NOTE
The [facility] and [address],  Sports Performance and Rehabilitation, [facility] [address]  [address] [facility],[address]   [place]  Phone: [phone]  Fax: [phone]      Physical Therapy Treatment Note/ Progress Report:           Date:  2022  Patient Name:  Courtney Cushing    :  1990  MRN: [id number]  Restrictions/Precautions:    Medical/Treatment Diagnosis Information:    Diagnosis: A83.653 left shoulder pain      Insurance/Certification information:     Physician Information:     Has the plan of care been signed (Y/N):        []  Yes  [x]  No     Date of Patient follow up with Physician:       Is this a Progress Report:     []  Yes  [x]  No        If Yes:  Date Range for reporting period:  Beginning  Ending    Progress report will be due (10 Rx or 30 days whichever is less):        Recertification will be due (POC Duration  / 90 days whichever is less):         Visit # Insurance Allowable Auth Required   10  []  Yes []  No        Functional Scale:    Date assessed:        Latex Allergy:  [x]NO      []YES  Preferred Language for Healthcare:   [x]English       []other:      Pain level:  nr/10     SUBJECTIVE:  \"I am using my shoulder blades better. Gweneth Latch Gweneth Latch \"     OBJECTIVE:      Observation:  Mod inf capsule tightness; 4/5 safe zone; 4-/5 scapula    Test measurements:      RESTRICTIONS/PRECAUTIONS:     Exercises/Interventions:       Therapeutic Ex (14262) Sets/sec Reps Notes/CUES   Ll/ld er; ir   5'  (1#)    Supine cane @ er   4 x 20\"    Supine overhead   10 x 10\"     Supine ll/ld pec       Wall walk   10\" x 5    Standing ir mulligan       Seated ball roll on wedge   30x with resist     CC:  Rows, pull downs      Quadruped fwd rock   20x    Supine punches   30x mr    Standing scaptions   30x     Ball push up with +    sidelying er      Table lift off     Wall walk outs           20x MR     25x mr    15x    8x     Manual Intervention ([id number]) Prom/stm   20'                                                                                                                                             Therapeutic Exercise and NMR EXR  [x] (32172) Provided verbal/tactile cueing for activities related to strengthening, flexibility, endurance, ROM for improvements in LE, proximal hip, and core control with self care, mobility, lifting, ambulation. [x] (89439) Provided verbal/tactile cueing for activities related to improving balance, coordination, kinesthetic sense, posture, motor skill, proprioception to assist with LE, proximal hip, and core control in self-care, mobility, lifting, ambulation and eccentric single leg control.      NMR and Therapeutic Activities:    [x] (01126 or 89217) Provided verbal/tactile cueing for activities related to improving balance, coordination, kinesthetic sense, posture, motor skill, proprioception and motor activation to allow for proper function of core, proximal hip and LE with self-care and ADLs and functional mobility.   [] (30540) Gait Re-education- Provided training and instruction to the patient for proper LE, core and proximal hip recruitment and positioning and eccentric body weight control with ambulation re-education including up and down stairs     Home Exercise Program:    [x] (59671) Reviewed/Progressed HEP activities related to strengthening, flexibility, endurance, ROM of core, proximal hip and LE for functional self-care, mobility, lifting and ambulation/stair navigation   [] (39366) Reviewed/Progressed HEP activities related to improving balance, coordination, kinesthetic sense, posture, motor skill, proprioception of core, proximal hip and LE for self-care, mobility, lifting, and ambulation/stair navigation      Manual Treatments:  PROM / STM / Oscillations-Mobs:  G-I, II, III, IV (PA's, Inf., Post.)  [x] (03426) Provided manual therapy to mobilize LE, proximal hip and/or LS spine soft tissue/joints for the purpose of modulating pain, promoting relaxation, increasing ROM, reducing/eliminating soft tissue swelling/inflammation/restriction, improving soft tissue extensibility and allowing for proper ROM for normal function with self-care, mobility, lifting and ambulation. Modalities:     [] GAME READY (VASO)- for significant edema, swelling, pain control. Charges:  Timed Code Treatment Minutes: 35'    Total Treatment Minutes: 28'       [] EVAL (LOW) 14050 (typically 20 minutes face-to-face)  [] EVAL (MOD) 29921 (typically 30 minutes face-to-face)  [] EVAL (HIGH) 65884 (typically 45 minutes face-to-face)  [] RE-EVAL     [x] KL(84418)  X 1 [] IONTO  [] NMR (45603)   [] VASO  [x] Manual (81368) x 1  [] Other:  [] TA x      [] Mech Traction (16979)  [] ES(attended) (84249)      [] ES (un) (85194):         ASSESSMENT:   Tolerated       GOALS:  Patient stated goal:     [] Progressing: [] Met: [] Not Met: [] Adjusted    Therapist goals for Patient:   Short Term Goals: To be achieved in: 2 weeks  1. Independent in HEP and progression per patient tolerance, in order to prevent re-injury. [x] Progressing: [] Met: [] Not Met: [] Adjusted   2. Patient will have a decrease in pain to facilitate improvement in movement, function, and ADLs as indicated by Functional Deficits. [x] Progressing: [] Met: [] Not Met: [] Adjusted     Long Term Goals: To be achieved in:   10 weeks  - The patient is expected to demonstrate less than 10% impairment, limitation or restriction in:  -- carrying, moving and handling objects. - Patient will demonstrate increased passive and active ROM to full, symmetrical and painfree to allow for proper joint functioning as indicated by patients Functional Deficits. [x] Progressing: [] Met: [] Not Met: [] Adjusted   - Patient will demonstrate an increase in Strength to symmetrical and painfree to allow for proper functional mobility as indicated by patients Functional Deficits.    [x] Progressing: [] Met: [] Not Met: [] Adjusted   - Patient will return to desired, higher level upper extremity functional activities without increased symptoms or restriction. [x] Progressing: [] Met: [] Not Met: [] Adjusted           Overall Progression Towards Functional goals/ Treatment Progress Update:  [] Patient is progressing as expected towards functional goals listed. [] Progression is slowed due to complexities/Impairments listed. [] Progression has been slowed due to co-morbidities. [x] Plan just implemented, too soon to assess goals progression <30days   [] Goals require adjustment due to lack of progress  [] Patient is not progressing as expected and requires additional follow up with physician  [] Other    Prognosis for POC: [x] Good [] Fair  [] Poor      Patient requires continued skilled intervention: [x] Yes  [] No    Treatment/Activity Tolerance:  [x] Patient able to complete treatment  [] Patient limited by fatigue  [] Patient limited by pain    [] Patient limited by other medical complications  [] Other:         Return to Play: (if applicable)   []  Stage 1: Intro to Strength   []  Stage 2: Return to Run and Strength   []  Stage 3: Return to Jump and Strength   []  Stage 4: Dynamic Strength and Agility   []  Stage 5: Sport Specific Training     []  Ready to Return to Play, Meets All Above Stages   []  Not Ready for Return to Sports   Comments:                               PLAN: See eval  [x] Continue per plan of care [] Alter current plan (see comments above)  [] Plan of care initiated [] Hold pending MD visit [] Discharge      Electronically signed by:  Eladio Silva PT, MPT     Note: If patient does not return for scheduled/ recommended follow up visits, this note will serve as a discharge from care along with most recent update on progress.

## 2022-01-16 NOTE — FLOWSHEET NOTE
The [facility] and [address],  Sports Performance and Rehabilitation, [facility], [facility] [address]  [address] [facility],5[address]   [place]  Phone: [phone]  Fax: [phone]      Physical Therapy Treatment Note/ Progress Report:           Date:  2022  Patient Name:  César Moss    :  1990  MRN: [id number]  Restrictions/Precautions:    Medical/Treatment Diagnosis Information:    Diagnosis: F05.340 left shoulder pain      Insurance/Certification information:     Physician Information:     Has the plan of care been signed (Y/N):        []  Yes  [x]  No     Date of Patient follow up with Physician:       Is this a Progress Report:     []  Yes  [x]  No        If Yes:  Date Range for reporting period:  Beginning  Ending    Progress report will be due (10 Rx or 30 days whichever is less):        Recertification will be due (POC Duration  / 90 days whichever is less):         Visit # Insurance Allowable Auth Required   11  []  Yes []  No        Functional Scale:    Date assessed:        Latex Allergy:  [x]NO      []YES  Preferred Language for Healthcare:   [x]English       []other:      Pain level:  nr/10     SUBJECTIVE:  Progress note    OBJECTIVE:      Observation:  Progress note    Test measurements:      RESTRICTIONS/PRECAUTIONS:     Exercises/Interventions:       Therapeutic Ex (64150) Sets/sec Reps Notes/CUES   Ll/ld er; ir   5'  (1#)    Supine cane @ er   4 x 20\"    Supine overhead   10 x 10\"     Supine ll/ld pec       Wall walk   10\" x 5    Standing ir mulligan       Seated ball roll on wedge   30x with resist     CC:  Rows, pull downs      Quadruped fwd rock   20x    Supine punches   30x mr    Standing scaptions   30x     Ball push up with +    sidelying er      Table lift off     Wall walk outs           20x MR     25x mr    15x    8x     Manual Intervention (51373)      Prom/stm   25' Therapeutic Exercise and NMR EXR  [x] (77772) Provided verbal/tactile cueing for activities related to strengthening, flexibility, endurance, ROM for improvements in LE, proximal hip, and core control with self care, mobility, lifting, ambulation. [x] (05701) Provided verbal/tactile cueing for activities related to improving balance, coordination, kinesthetic sense, posture, motor skill, proprioception to assist with LE, proximal hip, and core control in self-care, mobility, lifting, ambulation and eccentric single leg control.      NMR and Therapeutic Activities:    [x] (82580 or 07137) Provided verbal/tactile cueing for activities related to improving balance, coordination, kinesthetic sense, posture, motor skill, proprioception and motor activation to allow for proper function of core, proximal hip and LE with self-care and ADLs and functional mobility.   [] (45830) Gait Re-education- Provided training and instruction to the patient for proper LE, core and proximal hip recruitment and positioning and eccentric body weight control with ambulation re-education including up and down stairs     Home Exercise Program:    [x] (38468) Reviewed/Progressed HEP activities related to strengthening, flexibility, endurance, ROM of core, proximal hip and LE for functional self-care, mobility, lifting and ambulation/stair navigation   [] (47186) Reviewed/Progressed HEP activities related to improving balance, coordination, kinesthetic sense, posture, motor skill, proprioception of core, proximal hip and LE for self-care, mobility, lifting, and ambulation/stair navigation      Manual Treatments:  PROM / STM / Oscillations-Mobs:  G-I, II, III, IV (PA's, Inf., Post.)  [x] (63836) Provided manual therapy to mobilize LE, proximal hip and/or LS spine soft tissue/joints for the purpose of modulating pain, promoting relaxation, increasing ROM, reducing/eliminating soft tissue swelling/inflammation/restriction, improving soft tissue extensibility and allowing for proper ROM for normal function with self-care, mobility, lifting and ambulation. Modalities:     [] GAME READY (VASO)- for significant edema, swelling, pain control. Charges:  Timed Code Treatment Minutes: 35'    Total Treatment Minutes: 28'       [] EVAL (LOW) 58048 (typically 20 minutes face-to-face)  [] EVAL (MOD) 30006 (typically 30 minutes face-to-face)  [] EVAL (HIGH) 27477 (typically 45 minutes face-to-face)  [] RE-EVAL     [] HH(91922)   [] IONTO  [] NMR (69621)   [] VASO  [x] Manual (21570) x 2  [] Other:  [] TA x      [] Mech Traction (51775)  [] ES(attended) (85478)      [] ES (un) (41834):         ASSESSMENT:   Tolerated       GOALS:  Patient stated goal:     [] Progressing: [] Met: [] Not Met: [] Adjusted    Therapist goals for Patient:   Short Term Goals: To be achieved in: 2 weeks  1. Independent in HEP and progression per patient tolerance, in order to prevent re-injury. [x] Progressing: [] Met: [] Not Met: [] Adjusted   2. Patient will have a decrease in pain to facilitate improvement in movement, function, and ADLs as indicated by Functional Deficits. [x] Progressing: [] Met: [] Not Met: [] Adjusted     Long Term Goals: To be achieved in:   10 weeks  - The patient is expected to demonstrate less than 10% impairment, limitation or restriction in:  -- carrying, moving and handling objects. - Patient will demonstrate increased passive and active ROM to full, symmetrical and painfree to allow for proper joint functioning as indicated by patients Functional Deficits. [x] Progressing: [] Met: [] Not Met: [] Adjusted   - Patient will demonstrate an increase in Strength to symmetrical and painfree to allow for proper functional mobility as indicated by patients Functional Deficits.    [x] Progressing: [] Met: [] Not Met: [] Adjusted   - Patient will return to desired, higher level upper extremity functional activities without increased symptoms or restriction. [x] Progressing: [] Met: [] Not Met: [] Adjusted           Overall Progression Towards Functional goals/ Treatment Progress Update:  [] Patient is progressing as expected towards functional goals listed. [] Progression is slowed due to complexities/Impairments listed. [] Progression has been slowed due to co-morbidities. [x] Plan just implemented, too soon to assess goals progression <30days   [] Goals require adjustment due to lack of progress  [] Patient is not progressing as expected and requires additional follow up with physician  [] Other    Prognosis for POC: [x] Good [] Fair  [] Poor      Patient requires continued skilled intervention: [x] Yes  [] No    Treatment/Activity Tolerance:  [x] Patient able to complete treatment  [] Patient limited by fatigue  [] Patient limited by pain    [] Patient limited by other medical complications  [] Other:         Return to Play: (if applicable)   []  Stage 1: Intro to Strength   []  Stage 2: Return to Run and Strength   []  Stage 3: Return to Jump and Strength   []  Stage 4: Dynamic Strength and Agility   []  Stage 5: Sport Specific Training     []  Ready to Return to Play, Meets All Above Stages   []  Not Ready for Return to Sports   Comments:                               PLAN: See eval  [x] Continue per plan of care [] Alter current plan (see comments above)  [] Plan of care initiated [] Hold pending MD visit [] Discharge      Electronically signed by:  Alan Lenz PT, MPT     Note: If patient does not return for scheduled/ recommended follow up visits, this note will serve as a discharge from care along with most recent update on progress.

## 2022-01-18 ENCOUNTER — HOSPITAL ENCOUNTER (OUTPATIENT)
Dept: PHYSICAL THERAPY | Age: 32
Setting detail: THERAPIES SERIES
Discharge: HOME | End: 2022-01-18
Payer: COMMERCIAL

## 2022-01-18 PROCEDURE — 97140 MANUAL THERAPY 1/> REGIONS: CPT | Performed by: PHYSICAL THERAPIST

## 2022-01-18 PROCEDURE — 97110 THERAPEUTIC EXERCISES: CPT | Performed by: PHYSICAL THERAPIST

## 2022-01-20 NOTE — FLOWSHEET NOTE
The [facility] and [address],  Sports Performance and Rehabilitation, [facility] [address]  [address] [facility],5[address]   [place]  Phone: [phone]  Fax: [phone]      Physical Therapy Treatment Note/ Progress Report:           Date:  2022  Patient Name:  Norberto Wheatley    :  1990  MRN: [id number]  Restrictions/Precautions:    Medical/Treatment Diagnosis Information:    Diagnosis: X94.827 left shoulder pain      Insurance/Certification information:     Physician Information:     Has the plan of care been signed (Y/N):        []  Yes  [x]  No     Date of Patient follow up with Physician:       Is this a Progress Report:     []  Yes  [x]  No        If Yes:  Date Range for reporting period:  Beginning  Ending    Progress report will be due (10 Rx or 30 days whichever is less):        Recertification will be due (POC Duration  / 90 days whichever is less):         Visit # Insurance Allowable Auth Required   12  []  Yes []  No        Functional Scale:    Date assessed:        Latex Allergy:  [x]NO      []YES  Preferred Language for Healthcare:   [x]English       []other:      Pain level:  nr/10     SUBJECTIVE:  Progress note    OBJECTIVE:      Observation:  Progress note    Test measurements:      RESTRICTIONS/PRECAUTIONS:     Exercises/Interventions:       Therapeutic Ex (41861) Sets/sec Reps Notes/CUES   Ll/ld er; ir   5'  (1#)    Supine cane @ er   4 x 20\"    Supine overhead   10 x 10\"     Supine ll/ld pec       Wall walk   10\" x 5    Standing ir mulligan       Seated ball roll on wedge   30x with resist     CC:  Rows, pull downs      Quadruped fwd rock   20x    Supine punches   30x mr    Standing scaptions   30x     Ball push up with +    sidelying er      Table lift off     Swiss  walk outs/mini  Push ups           20x MR     25x mr    15x    8x/10x     Manual Intervention (50078)      Prom/stm   18' Therapeutic Exercise and NMR EXR  [x] (42537) Provided verbal/tactile cueing for activities related to strengthening, flexibility, endurance, ROM for improvements in LE, proximal hip, and core control with self care, mobility, lifting, ambulation. [x] (78613) Provided verbal/tactile cueing for activities related to improving balance, coordination, kinesthetic sense, posture, motor skill, proprioception to assist with LE, proximal hip, and core control in self-care, mobility, lifting, ambulation and eccentric single leg control.      NMR and Therapeutic Activities:    [x] (14636 or 49039) Provided verbal/tactile cueing for activities related to improving balance, coordination, kinesthetic sense, posture, motor skill, proprioception and motor activation to allow for proper function of core, proximal hip and LE with self-care and ADLs and functional mobility.   [] (48072) Gait Re-education- Provided training and instruction to the patient for proper LE, core and proximal hip recruitment and positioning and eccentric body weight control with ambulation re-education including up and down stairs     Home Exercise Program:    [x] (02221) Reviewed/Progressed HEP activities related to strengthening, flexibility, endurance, ROM of core, proximal hip and LE for functional self-care, mobility, lifting and ambulation/stair navigation   [] (33390) Reviewed/Progressed HEP activities related to improving balance, coordination, kinesthetic sense, posture, motor skill, proprioception of core, proximal hip and LE for self-care, mobility, lifting, and ambulation/stair navigation      Manual Treatments:  PROM / STM / Oscillations-Mobs:  G-I, II, III, IV (PA's, Inf., Post.)  [x] (67775) Provided manual therapy to mobilize LE, proximal hip and/or LS spine soft tissue/joints for the purpose of modulating pain, promoting relaxation, increasing ROM, reducing/eliminating soft tissue swelling/inflammation/restriction, improving soft tissue extensibility and allowing for proper ROM for normal function with self-care, mobility, lifting and ambulation. Modalities:     [] GAME READY (VASO)- for significant edema, swelling, pain control. Charges:  Timed Code Treatment Minutes: 35'    Total Treatment Minutes: 28'       [] EVAL (LOW) 42664 (typically 20 minutes face-to-face)  [] EVAL (MOD) 09225 (typically 30 minutes face-to-face)  [] EVAL (HIGH) 95936 (typically 45 minutes face-to-face)  [] RE-EVAL     [x] XN(87977)  1 [] IONTO  [] NMR (69376)   [] VASO  [x] Manual (18233) x 1  [] Other:  [] TA x      [] Mech Traction (44030)  [] ES(attended) (26780)      [] ES (un) (80159):         ASSESSMENT:   Tolerated       GOALS:  Patient stated goal:     [] Progressing: [] Met: [] Not Met: [] Adjusted    Therapist goals for Patient:   Short Term Goals: To be achieved in: 2 weeks  1. Independent in HEP and progression per patient tolerance, in order to prevent re-injury. [x] Progressing: [] Met: [] Not Met: [] Adjusted   2. Patient will have a decrease in pain to facilitate improvement in movement, function, and ADLs as indicated by Functional Deficits. [x] Progressing: [] Met: [] Not Met: [] Adjusted     Long Term Goals: To be achieved in:   10 weeks  - The patient is expected to demonstrate less than 10% impairment, limitation or restriction in:  -- carrying, moving and handling objects. - Patient will demonstrate increased passive and active ROM to full, symmetrical and painfree to allow for proper joint functioning as indicated by patients Functional Deficits. [x] Progressing: [] Met: [] Not Met: [] Adjusted   - Patient will demonstrate an increase in Strength to symmetrical and painfree to allow for proper functional mobility as indicated by patients Functional Deficits.    [x] Progressing: [] Met: [] Not Met: [] Adjusted   - Patient will return to desired, higher level upper extremity functional activities without increased symptoms or restriction. [x] Progressing: [] Met: [] Not Met: [] Adjusted           Overall Progression Towards Functional goals/ Treatment Progress Update:  [] Patient is progressing as expected towards functional goals listed. [] Progression is slowed due to complexities/Impairments listed. [] Progression has been slowed due to co-morbidities. [x] Plan just implemented, too soon to assess goals progression <30days   [] Goals require adjustment due to lack of progress  [] Patient is not progressing as expected and requires additional follow up with physician  [] Other    Prognosis for POC: [x] Good [] Fair  [] Poor      Patient requires continued skilled intervention: [x] Yes  [] No    Treatment/Activity Tolerance:  [x] Patient able to complete treatment  [] Patient limited by fatigue  [] Patient limited by pain    [] Patient limited by other medical complications  [] Other:         Return to Play: (if applicable)   []  Stage 1: Intro to Strength   []  Stage 2: Return to Run and Strength   []  Stage 3: Return to Jump and Strength   []  Stage 4: Dynamic Strength and Agility   []  Stage 5: Sport Specific Training     []  Ready to Return to Play, Meets All Above Stages   []  Not Ready for Return to Sports   Comments:                               PLAN: See eval  [x] Continue per plan of care [] Alter current plan (see comments above)  [] Plan of care initiated [] Hold pending MD visit [] Discharge      Electronically signed by:  Urmila Parker PT, MPT     Note: If patient does not return for scheduled/ recommended follow up visits, this note will serve as a discharge from care along with most recent update on progress.

## 2022-01-24 ENCOUNTER — HOSPITAL ENCOUNTER (OUTPATIENT)
Dept: PHYSICAL THERAPY | Age: 32
Setting detail: THERAPIES SERIES
Discharge: HOME | End: 2022-01-24
Payer: COMMERCIAL

## 2022-01-24 PROCEDURE — 97140 MANUAL THERAPY 1/> REGIONS: CPT | Performed by: PHYSICAL THERAPIST

## 2022-01-24 PROCEDURE — 97110 THERAPEUTIC EXERCISES: CPT | Performed by: PHYSICAL THERAPIST

## 2022-01-28 ENCOUNTER — APPOINTMENT (OUTPATIENT)
Dept: PHYSICAL THERAPY | Age: 32
End: 2022-01-28
Payer: COMMERCIAL

## 2022-01-29 NOTE — FLOWSHEET NOTE
The [facility] and [address],  Sports Performance and Rehabilitation, [facility] 6199 7[address]  793 [facility],5[address] Floor   [place]  Phone: [phone]  Fax: [phone]      Physical Therapy Treatment Note/ Progress Report:           Date:  2022  Patient Name:  Norberto Wheatley    :  1990  MRN: [id number]  Restrictions/Precautions:    Medical/Treatment Diagnosis Information:    Diagnosis: O97.458 left shoulder pain      Insurance/Certification information:     Physician Information:     Has the plan of care been signed (Y/N):        []  Yes  [x]  No     Date of Patient follow up with Physician:       Is this a Progress Report:     []  Yes  [x]  No        If Yes:  Date Range for reporting period:  Beginning  Ending    Progress report will be due (10 Rx or 30 days whichever is less):        Recertification will be due (POC Duration  / 90 days whichever is less):         Visit # Insurance Allowable Auth Required   13  []  Yes []  No        Functional Scale:    Date assessed:        Latex Allergy:  [x]NO      []YES  Preferred Language for Healthcare:   [x]English       []other:      Pain level:  nr/10     SUBJECTIVE:  \"doing ok. .. doing better\"    OBJECTIVE:      Observation:   safe zone; prom decreased by only 5%   Test measurements:      RESTRICTIONS/PRECAUTIONS:     Exercises/Interventions:       Therapeutic Ex (76286) Sets/sec Reps Notes/CUES   Ll/ld er; ir   5'  (1#)    Supine cane @ er   4 x 20\"    Supine overhead   10 x 10\"     Supine ll/ld pec       Wall walk   10\" x 5    Standing ir mulligan       Seated ball roll on wedge   30x with resist     CC:  Rows, pull downs      Quadruped fwd rock   20x    Supine punches   30x mr    Standing scaptions   30x     Ball push up with +    sidelying er      Table lift off     Swiss  walk outs/mini  Push ups           20x MR     25x mr    15x    8x/10x     Manual Intervention (52319)      Prom/stm   18' Therapeutic Exercise and NMR EXR  [x] (88150) Provided verbal/tactile cueing for activities related to strengthening, flexibility, endurance, ROM for improvements in LE, proximal hip, and core control with self care, mobility, lifting, ambulation. [x] (05193) Provided verbal/tactile cueing for activities related to improving balance, coordination, kinesthetic sense, posture, motor skill, proprioception to assist with LE, proximal hip, and core control in self-care, mobility, lifting, ambulation and eccentric single leg control.      NMR and Therapeutic Activities:    [x] (14517 or 84583) Provided verbal/tactile cueing for activities related to improving balance, coordination, kinesthetic sense, posture, motor skill, proprioception and motor activation to allow for proper function of core, proximal hip and LE with self-care and ADLs and functional mobility.   [] (17506) Gait Re-education- Provided training and instruction to the patient for proper LE, core and proximal hip recruitment and positioning and eccentric body weight control with ambulation re-education including up and down stairs     Home Exercise Program:    [x] (71563) Reviewed/Progressed HEP activities related to strengthening, flexibility, endurance, ROM of core, proximal hip and LE for functional self-care, mobility, lifting and ambulation/stair navigation   [] (23186) Reviewed/Progressed HEP activities related to improving balance, coordination, kinesthetic sense, posture, motor skill, proprioception of core, proximal hip and LE for self-care, mobility, lifting, and ambulation/stair navigation      Manual Treatments:  PROM / STM / Oscillations-Mobs:  G-I, II, III, IV (PA's, Inf., Post.)  [x] (02177) Provided manual therapy to mobilize LE, proximal hip and/or LS spine soft tissue/joints for the purpose of modulating pain, promoting relaxation, increasing ROM, reducing/eliminating soft tissue swelling/inflammation/restriction, improving soft tissue extensibility and allowing for proper ROM for normal function with self-care, mobility, lifting and ambulation. Modalities:     [] GAME READY (VASO)- for significant edema, swelling, pain control. Charges:  Timed Code Treatment Minutes: 35'    Total Treatment Minutes: 28'       [] EVAL (LOW) 44655 (typically 20 minutes face-to-face)  [] EVAL (MOD) 47566 (typically 30 minutes face-to-face)  [] EVAL (HIGH) 90168 (typically 45 minutes face-to-face)  [] RE-EVAL     [x] UI(48170)  1 [] IONTO  [] NMR (63105)   [] VASO  [x] Manual (63345) x 1  [] Other:  [] TA x      [] Mech Traction (76963)  [] ES(attended) (48330)      [] ES (un) (21512):         ASSESSMENT:   Tolerated       GOALS:  Patient stated goal:     [] Progressing: [] Met: [] Not Met: [] Adjusted    Therapist goals for Patient:   Short Term Goals: To be achieved in: 2 weeks  1. Independent in HEP and progression per patient tolerance, in order to prevent re-injury. [x] Progressing: [] Met: [] Not Met: [] Adjusted   2. Patient will have a decrease in pain to facilitate improvement in movement, function, and ADLs as indicated by Functional Deficits. [x] Progressing: [] Met: [] Not Met: [] Adjusted     Long Term Goals: To be achieved in:   10 weeks  - The patient is expected to demonstrate less than 10% impairment, limitation or restriction in:  -- carrying, moving and handling objects. - Patient will demonstrate increased passive and active ROM to full, symmetrical and painfree to allow for proper joint functioning as indicated by patients Functional Deficits. [x] Progressing: [] Met: [] Not Met: [] Adjusted   - Patient will demonstrate an increase in Strength to symmetrical and painfree to allow for proper functional mobility as indicated by patients Functional Deficits.    [x] Progressing: [] Met: [] Not Met: [] Adjusted   - Patient will return to desired, higher level upper extremity functional activities without increased symptoms or restriction. [x] Progressing: [] Met: [] Not Met: [] Adjusted           Overall Progression Towards Functional goals/ Treatment Progress Update:  [] Patient is progressing as expected towards functional goals listed. [] Progression is slowed due to complexities/Impairments listed. [] Progression has been slowed due to co-morbidities. [x] Plan just implemented, too soon to assess goals progression <30days   [] Goals require adjustment due to lack of progress  [] Patient is not progressing as expected and requires additional follow up with physician  [] Other    Prognosis for POC: [x] Good [] Fair  [] Poor      Patient requires continued skilled intervention: [x] Yes  [] No    Treatment/Activity Tolerance:  [x] Patient able to complete treatment  [] Patient limited by fatigue  [] Patient limited by pain    [] Patient limited by other medical complications  [] Other:         Return to Play: (if applicable)   []  Stage 1: Intro to Strength   []  Stage 2: Return to Run and Strength   []  Stage 3: Return to Jump and Strength   []  Stage 4: Dynamic Strength and Agility   []  Stage 5: Sport Specific Training     []  Ready to Return to Play, Meets All Above Stages   []  Not Ready for Return to Sports   Comments:                               PLAN: See eval  [x] Continue per plan of care [] Alter current plan (see comments above)  [] Plan of care initiated [] Hold pending MD visit [] Discharge      Electronically signed by:  Trinidad Gamboa PT, MPT     Note: If patient does not return for scheduled/ recommended follow up visits, this note will serve as a discharge from care along with most recent update on progress.

## 2022-02-08 ENCOUNTER — HOSPITAL ENCOUNTER (OUTPATIENT)
Dept: PHYSICAL THERAPY | Age: 32
Setting detail: THERAPIES SERIES
Discharge: HOME | End: 2022-02-08
Payer: COMMERCIAL

## 2022-02-08 PROCEDURE — 97140 MANUAL THERAPY 1/> REGIONS: CPT | Performed by: PHYSICAL THERAPIST

## 2022-02-11 ENCOUNTER — APPOINTMENT (OUTPATIENT)
Dept: PHYSICAL THERAPY | Age: 32
End: 2022-02-11
Payer: COMMERCIAL

## 2022-02-12 NOTE — FLOWSHEET NOTE
The [address] [facility] and [address],  Sports Performance and Rehabilitation, [facility] 4799 [address]46 [address]  793 [facility],5[address]   [place], [place]  Phone: [phone]  Fax: [phone]      Physical Therapy Treatment Note/ Progress Report:           Date: 2022  Patient Name:  Ross George    :  1990  MRN: [id number]  Restrictions/Precautions:    Medical/Treatment Diagnosis Information:    Diagnosis: L51.227 left shoulder pain      Insurance/Certification information:     Physician Information:     Has the plan of care been signed (Y/N):        []  Yes  [x]  No     Date of Patient follow up with Physician:       Is this a Progress Report:     []  Yes  [x]  No        If Yes:  Date Range for reporting period:  Beginning  Ending    Progress report will be due (10 Rx or 30 days whichever is less):        Recertification will be due (POC Duration  / 90 days whichever is less):         Visit # Insurance Allowable Auth Required   14  []  Yes []  No        Functional Scale:    Date assessed:        Latex Allergy:  [x]NO      []YES  Preferred Language for Healthcare:   [x]English       []other:      Pain level:  nr/10     SUBJECTIVE:  \"feeling ok. . doing more\"    OBJECTIVE:      Observation:   safe zone   Test measurements:      RESTRICTIONS/PRECAUTIONS:     Exercises/Interventions:       Therapeutic Ex (48613) Sets/sec Reps Notes/CUES   Ll/ld er; ir   5'  (1#)    Supine cane @ er   4 x 20\"    Supine overhead   10 x 10\"     Supine ll/ld pec       Wall walk   10\" x 5    Standing ir mulligan       Seated ball roll on wedge   30x with resist     CC:  Rows, pull downs      Quadruped fwd rock   20x    Supine punches   30x mr    Standing scaptions   30x     Ball push up with +    sidelying er      Table lift off     Swiss  walk outs/mini  Push ups           20x MR     25x mr      Manual Intervention (81035)      Prom/stm   18' Therapeutic Exercise and NMR EXR  [x] (48051) Provided verbal/tactile cueing for activities related to strengthening, flexibility, endurance, ROM for improvements in LE, proximal hip, and core control with self care, mobility, lifting, ambulation. [x] (78076) Provided verbal/tactile cueing for activities related to improving balance, coordination, kinesthetic sense, posture, motor skill, proprioception to assist with LE, proximal hip, and core control in self-care, mobility, lifting, ambulation and eccentric single leg control.      NMR and Therapeutic Activities:    [x] (52210 or 90628) Provided verbal/tactile cueing for activities related to improving balance, coordination, kinesthetic sense, posture, motor skill, proprioception and motor activation to allow for proper function of core, proximal hip and LE with self-care and ADLs and functional mobility.   [] (59639) Gait Re-education- Provided training and instruction to the patient for proper LE, core and proximal hip recruitment and positioning and eccentric body weight control with ambulation re-education including up and down stairs     Home Exercise Program:    [x] (18293) Reviewed/Progressed HEP activities related to strengthening, flexibility, endurance, ROM of core, proximal hip and LE for functional self-care, mobility, lifting and ambulation/stair navigation   [] (92169) Reviewed/Progressed HEP activities related to improving balance, coordination, kinesthetic sense, posture, motor skill, proprioception of core, proximal hip and LE for self-care, mobility, lifting, and ambulation/stair navigation      Manual Treatments:  PROM / STM / Oscillations-Mobs:  G-I, II, III, IV (PA's, Inf., Post.)  [x] (46573) Provided manual therapy to mobilize LE, proximal hip and/or LS spine soft tissue/joints for the purpose of modulating pain, promoting relaxation, increasing ROM, higher level upper extremity functional activities without increased symptoms or restriction. [x] Progressing: [] Met: [] Not Met: [] Adjusted           Overall Progression Towards Functional goals/ Treatment Progress Update:  [] Patient is progressing as expected towards functional goals listed. [] Progression is slowed due to complexities/Impairments listed. [] Progression has been slowed due to co-morbidities. [x] Plan just implemented, too soon to assess goals progression <30days   [] Goals require adjustment due to lack of progress  [] Patient is not progressing as expected and requires additional follow up with physician  [] Other    Prognosis for POC: [x] Good [] Fair  [] Poor      Patient requires continued skilled intervention: [x] Yes  [] No    Treatment/Activity Tolerance:  [x] Patient able to complete treatment  [] Patient limited by fatigue  [] Patient limited by pain    [] Patient limited by other medical complications  [] Other:         Return to Play: (if applicable)   []  Stage 1: Intro to Strength   []  Stage 2: Return to Run and Strength   []  Stage 3: Return to Jump and Strength   []  Stage 4: Dynamic Strength and Agility   []  Stage 5: Sport Specific Training     []  Ready to Return to Play, Meets All Above Stages   []  Not Ready for Return to Sports   Comments:                               PLAN: See eval  [x] Continue per plan of care [] Alter current plan (see comments above)  [] Plan of care initiated [] Hold pending MD visit [] Discharge      Electronically signed by:  Te Recinos PT, MPT     Note: If patient does not return for scheduled/ recommended follow up visits, this note will serve as a discharge from care along with most recent update on progress.

## 2022-02-15 ENCOUNTER — HOSPITAL ENCOUNTER (OUTPATIENT)
Dept: PHYSICAL THERAPY | Age: 32
Setting detail: THERAPIES SERIES
Discharge: HOME | End: 2022-02-15
Payer: COMMERCIAL

## 2022-02-15 PROCEDURE — 97140 MANUAL THERAPY 1/> REGIONS: CPT | Performed by: PHYSICAL THERAPIST

## 2022-02-15 PROCEDURE — 97110 THERAPEUTIC EXERCISES: CPT | Performed by: PHYSICAL THERAPIST

## 2022-02-19 NOTE — FLOWSHEET NOTE
The [facility] and [address],  Sports Performance and Rehabilitation, [facility] 6199 [address]386 [address]  793 [facility],5[address] Floor   [place], [address]  Phone: [phone]  Fax: [phone]      Physical Therapy Treatment Note/ Progress Report:           Date: 2/15/2022  Patient Name:  Mars Arriaga    :  1990  MRN: [id number]  Restrictions/Precautions:    Medical/Treatment Diagnosis Information:    Diagnosis: F90.871 left shoulder pain      Insurance/Certification information:     Physician Information:     Has the plan of care been signed (Y/N):        []  Yes  [x]  No     Date of Patient follow up with Physician:       Is this a Progress Report:     []  Yes  [x]  No        If Yes:  Date Range for reporting period:  Beginning  Ending    Progress report will be due (10 Rx or 30 days whichever is less):        Recertification will be due (POC Duration  / 90 days whichever is less):         Visit # Insurance Allowable Auth Required   15  []  Yes []  No        Functional Scale:    Date assessed:        Latex Allergy:  [x]NO      []YES  Preferred Language for Healthcare:   [x]English       []other:      Pain level:  nr/10     SUBJECTIVE:  \"doing better\"    OBJECTIVE:      Observation:  5 safe zone   Test measurements:      RESTRICTIONS/PRECAUTIONS:     Exercises/Interventions:       Therapeutic Ex (89541) Sets/sec Reps Notes/CUES   Ll/ld er; ir   5'  (1#)    Supine cane @ er   4 x 20\"    Supine overhead   10 x 10\"     Supine ll/ld pec       Wall walk   10\" x 5    Standing ir mulligan       Seated ball roll on wedge   30x with resist     CC:  Rows, pull downs      Quadruped fwd rock   20x    Supine punches   30x mr    Standing scaptions   30x     Ball push up with +    sidelying er      Table lift off     Swiss  walk outs/mini  Push ups           20x MR     25x mr      Manual Intervention (25979)      Prom/stm   18' Therapeutic Exercise and NMR EXR  [x] (32786) Provided verbal/tactile cueing for activities related to strengthening, flexibility, endurance, ROM for improvements in LE, proximal hip, and core control with self care, mobility, lifting, ambulation. [x] (65475) Provided verbal/tactile cueing for activities related to improving balance, coordination, kinesthetic sense, posture, motor skill, proprioception to assist with LE, proximal hip, and core control in self-care, mobility, lifting, ambulation and eccentric single leg control.      NMR and Therapeutic Activities:    [x] (37758 or 65526) Provided verbal/tactile cueing for activities related to improving balance, coordination, kinesthetic sense, posture, motor skill, proprioception and motor activation to allow for proper function of core, proximal hip and LE with self-care and ADLs and functional mobility.   [] (95199) Gait Re-education- Provided training and instruction to the patient for proper LE, core and proximal hip recruitment and positioning and eccentric body weight control with ambulation re-education including up and down stairs     Home Exercise Program:    [x] (49485) Reviewed/Progressed HEP activities related to strengthening, flexibility, endurance, ROM of core, proximal hip and LE for functional self-care, mobility, lifting and ambulation/stair navigation   [] (86928) Reviewed/Progressed HEP activities related to improving balance, coordination, kinesthetic sense, posture, motor skill, proprioception of core, proximal hip and LE for self-care, mobility, lifting, and ambulation/stair navigation      Manual Treatments:  PROM / STM / Oscillations-Mobs:  G-I, II, III, IV (PA's, Inf., Post.)  [x] (48889) Provided manual therapy to mobilize LE, proximal hip and/or LS spine soft tissue/joints for the purpose of modulating pain, promoting relaxation, increasing ROM, reducing/eliminating soft tissue swelling/inflammation/restriction, improving soft tissue extensibility and allowing for proper ROM for normal function with self-care, mobility, lifting and ambulation. Modalities:     [] GAME READY (VASO)- for significant edema, swelling, pain control. Charges:  Timed Code Treatment Minutes: 39'   Total Treatment Minutes: 39'      [] EVAL (LOW) 65264 (typically 20 minutes face-to-face)  [] EVAL (MOD) 92402 (typically 30 minutes face-to-face)  [] EVAL (HIGH) 28272 (typically 45 minutes face-to-face)  [] RE-EVAL     [x] YH(47459) x 1 [] IONTO  [] NMR (99494)   [] VASO  [x] Manual (86439) x 1  [] Other:  [] TA x      [] Mech Traction (99069)  [] ES(attended) (70600)      [] ES (un) (86512):         ASSESSMENT:   Tolerated       GOALS:  Patient stated goal:     [] Progressing: [] Met: [] Not Met: [] Adjusted    Therapist goals for Patient:   Short Term Goals: To be achieved in: 2 weeks  1. Independent in HEP and progression per patient tolerance, in order to prevent re-injury. [x] Progressing: [] Met: [] Not Met: [] Adjusted   2. Patient will have a decrease in pain to facilitate improvement in movement, function, and ADLs as indicated by Functional Deficits. [x] Progressing: [] Met: [] Not Met: [] Adjusted     Long Term Goals: To be achieved in:   10 weeks  - The patient is expected to demonstrate less than 10% impairment, limitation or restriction in:  -- carrying, moving and handling objects. - Patient will demonstrate increased passive and active ROM to full, symmetrical and painfree to allow for proper joint functioning as indicated by patients Functional Deficits. [x] Progressing: [] Met: [] Not Met: [] Adjusted   - Patient will demonstrate an increase in Strength to symmetrical and painfree to allow for proper functional mobility as indicated by patients Functional Deficits.    [x] Progressing: [] Met: [] Not Met: [] Adjusted   - Patient will return to desired, higher level upper extremity functional activities without increased symptoms or restriction. [x] Progressing: [] Met: [] Not Met: [] Adjusted           Overall Progression Towards Functional goals/ Treatment Progress Update:  [] Patient is progressing as expected towards functional goals listed. [] Progression is slowed due to complexities/Impairments listed. [] Progression has been slowed due to co-morbidities. [x] Plan just implemented, too soon to assess goals progression <30days   [] Goals require adjustment due to lack of progress  [] Patient is not progressing as expected and requires additional follow up with physician  [] Other    Prognosis for POC: [x] Good [] Fair  [] Poor      Patient requires continued skilled intervention: [x] Yes  [] No    Treatment/Activity Tolerance:  [x] Patient able to complete treatment  [] Patient limited by fatigue  [] Patient limited by pain    [] Patient limited by other medical complications  [] Other:         Return to Play: (if applicable)   []  Stage 1: Intro to Strength   []  Stage 2: Return to Run and Strength   []  Stage 3: Return to Jump and Strength   []  Stage 4: Dynamic Strength and Agility   []  Stage 5: Sport Specific Training     []  Ready to Return to Play, Meets All Above Stages   []  Not Ready for Return to Sports   Comments:                               PLAN: See eval  [x] Continue per plan of care [] Alter current plan (see comments above)  [] Plan of care initiated [] Hold pending MD visit [] Discharge      Electronically signed by:  Rhonda Patton PT, MPT     Note: If patient does not return for scheduled/ recommended follow up visits, this note will serve as a discharge from care along with most recent update on progress.

## 2022-02-22 ENCOUNTER — HOSPITAL ENCOUNTER (OUTPATIENT)
Dept: PHYSICAL THERAPY | Age: 32
Setting detail: THERAPIES SERIES
Discharge: HOME | End: 2022-02-22
Payer: COMMERCIAL

## 2022-02-22 PROCEDURE — 97140 MANUAL THERAPY 1/> REGIONS: CPT | Performed by: PHYSICAL THERAPIST

## 2022-02-27 NOTE — FLOWSHEET NOTE
[facility] and [address],  Sports Performance and Rehabilitation, [facility] [address]  [address] [facility],5Th Floor   [place], [place]  Phone: [phone]  Fax: [phone]      Physical Therapy Treatment Note/ Progress Report:           Date: 2022  Patient Name:  César Moss    :  1990  MRN: [id number]  Restrictions/Precautions:    Medical/Treatment Diagnosis Information:    Diagnosis: K50.104 left shoulder pain      Insurance/Certification information:     Physician Information:     Has the plan of care been signed (Y/N):        []  Yes  [x]  No     Date of Patient follow up with Physician:       Is this a Progress Report:     []  Yes  [x]  No        If Yes:  Date Range for reporting period:  Beginning  Ending    Progress report will be due (10 Rx or 30 days whichever is less):        Recertification will be due (POC Duration  / 90 days whichever is less):         Visit # Insurance Allowable Auth Required   16  []  Yes []  No        Functional Scale:    Date assessed:        Latex Allergy:  [x]NO      []YES  Preferred Language for Healthcare:   [x]English       []other:      Pain level:  nr/10     SUBJECTIVE:  \"doing better\"    OBJECTIVE:      Observation:   safe zone   Test measurements:      RESTRICTIONS/PRECAUTIONS:     Exercises/Interventions:       Therapeutic Ex (52968) Sets/sec Reps Notes/CUES   Ll/ld er; ir   5'  (1#)    Supine cane @ er   4 x 20\"    Supine overhead   10 x 10\"     Supine ll/ld pec       Wall walk   10\" x 5    Standing ir mulligan       Seated ball roll on wedge   30x with resist     CC:  Rows, pull downs      Quadruped fwd rock   20x    Supine punches   30x mr    Standing scaptions   30x     Ball push up with +    sidelying er      Table lift off     Swiss  walk outs/mini  Push ups           20x MR     25x mr      Manual Intervention (05565)      Prom/stm   18' Therapeutic Exercise and NMR EXR  [x] (19121) Provided verbal/tactile cueing for activities related to strengthening, flexibility, endurance, ROM for improvements in LE, proximal hip, and core control with self care, mobility, lifting, ambulation. [x] (64794) Provided verbal/tactile cueing for activities related to improving balance, coordination, kinesthetic sense, posture, motor skill, proprioception to assist with LE, proximal hip, and core control in self-care, mobility, lifting, ambulation and eccentric single leg control.      NMR and Therapeutic Activities:    [x] (51471 or 20798) Provided verbal/tactile cueing for activities related to improving balance, coordination, kinesthetic sense, posture, motor skill, proprioception and motor activation to allow for proper function of core, proximal hip and LE with self-care and ADLs and functional mobility.   [] (92404) Gait Re-education- Provided training and instruction to the patient for proper LE, core and proximal hip recruitment and positioning and eccentric body weight control with ambulation re-education including up and down stairs     Home Exercise Program:    [x] (44927) Reviewed/Progressed HEP activities related to strengthening, flexibility, endurance, ROM of core, proximal hip and LE for functional self-care, mobility, lifting and ambulation/stair navigation   [] (67142) Reviewed/Progressed HEP activities related to improving balance, coordination, kinesthetic sense, posture, motor skill, proprioception of core, proximal hip and LE for self-care, mobility, lifting, and ambulation/stair navigation      Manual Treatments:  PROM / STM / Oscillations-Mobs:  G-I, II, III, IV (PA's, Inf., Post.)  [x] (81974) Provided manual therapy to mobilize LE, proximal hip and/or LS spine soft tissue/joints for the purpose of modulating pain, promoting relaxation, increasing ROM, reducing/eliminating soft tissue swelling/inflammation/restriction, improving soft tissue extensibility and allowing for proper ROM for normal function with self-care, mobility, lifting and ambulation. Modalities:     [] GAME READY (VASO)- for significant edema, swelling, pain control. Charges:  Timed Code Treatment Minutes: 39'   Total Treatment Minutes: 39'      [] EVAL (LOW) 31848 (typically 20 minutes face-to-face)  [] EVAL (MOD) 55613 (typically 30 minutes face-to-face)  [] EVAL (HIGH) 42893 (typically 45 minutes face-to-face)  [] RE-EVAL     [x] VN(29036) x 1 [] IONTO  [] NMR (43028)   [] VASO  [x] Manual (55297) x 1  [] Other:  [] TA x      [] Mech Traction (51668)  [] ES(attended) (08060)      [] ES (un) (68177):         ASSESSMENT:   Tolerated       GOALS:  Patient stated goal:     [] Progressing: [] Met: [] Not Met: [] Adjusted    Therapist goals for Patient:   Short Term Goals: To be achieved in: 2 weeks  1. Independent in HEP and progression per patient tolerance, in order to prevent re-injury. [x] Progressing: [] Met: [] Not Met: [] Adjusted   2. Patient will have a decrease in pain to facilitate improvement in movement, function, and ADLs as indicated by Functional Deficits. [x] Progressing: [] Met: [] Not Met: [] Adjusted     Long Term Goals: To be achieved in:   10 weeks  - The patient is expected to demonstrate less than 10% impairment, limitation or restriction in:  -- carrying, moving and handling objects. - Patient will demonstrate increased passive and active ROM to full, symmetrical and painfree to allow for proper joint functioning as indicated by patients Functional Deficits. [x] Progressing: [] Met: [] Not Met: [] Adjusted   - Patient will demonstrate an increase in Strength to symmetrical and painfree to allow for proper functional mobility as indicated by patients Functional Deficits.    [x] Progressing: [] Met: [] Not Met: [] Adjusted   - Patient will return to desired, higher level upper extremity functional activities without increased symptoms or restriction. [x] Progressing: [] Met: [] Not Met: [] Adjusted           Overall Progression Towards Functional goals/ Treatment Progress Update:  [] Patient is progressing as expected towards functional goals listed. [] Progression is slowed due to complexities/Impairments listed. [] Progression has been slowed due to co-morbidities. [x] Plan just implemented, too soon to assess goals progression <30days   [] Goals require adjustment due to lack of progress  [] Patient is not progressing as expected and requires additional follow up with physician  [] Other    Prognosis for POC: [x] Good [] Fair  [] Poor      Patient requires continued skilled intervention: [x] Yes  [] No    Treatment/Activity Tolerance:  [x] Patient able to complete treatment  [] Patient limited by fatigue  [] Patient limited by pain    [] Patient limited by other medical complications  [] Other:         Return to Play: (if applicable)   []  Stage 1: Intro to Strength   []  Stage 2: Return to Run and Strength   []  Stage 3: Return to Jump and Strength   []  Stage 4: Dynamic Strength and Agility   []  Stage 5: Sport Specific Training     []  Ready to Return to Play, Meets All Above Stages   []  Not Ready for Return to Sports   Comments:                               PLAN: See eval  [x] Continue per plan of care [] Alter current plan (see comments above)  [] Plan of care initiated [] Hold pending MD visit [] Discharge      Electronically signed by:  Eladio Silva, PT, MPT     Note: If patient does not return for scheduled/ recommended follow up visits, this note will serve as a discharge from care along with most recent update on progress.

## 2022-03-01 ENCOUNTER — HOSPITAL ENCOUNTER (OUTPATIENT)
Dept: PHYSICAL THERAPY | Age: 32
Discharge: HOME | End: 2022-03-01

## 2022-03-01 NOTE — FLOWSHEET NOTE
[address],  Sports Performance and Rehabilitation, [facility] [address] [place] [address], [address] [place] [address]. [address]  P: [phone]  F: [phone]                                                                      Upper Extremity Daily Performance Training Note  Date:  3/1/2022    Patient Name:  Raad Lindsey    :  1990  MRN: [id number]  Restrictions/Precautions:   Medical/Treatment Diagnosis Information:  ·  L RCR/debridement, DOS 21, no specific EDGARDO, chronic shoulder pain   ·  / personal training  Full return to ADLs without having to think about it, would like to return to the gym (pushup, pull up, OH press/pushup, handstand)     Physician Information:  Kelly Nascimento PRN    Visit Number:     Subjective: shoulder feels good, still notices some weakness, painful popping/clicking (doesn't linger)    Objective:  · Observation:  · AROM WFL globally, limited IR 90/90 Fx IR- decreased by 6\"  · MMT 5/5 globally    Exercises:  Exercise/Equipment  3   Wall stretch 3x30\" HEP   Bicep stretch 3x30\" HEP   StrapStretch x2' HEP 2xday   Bodyblade IE/ER/flex 2x20\" / 2x20\"    Alfonso Oms / Lopez Picking / Yaniv Preston / Divine Galindon / x15   MB Chest Pass 3D 4# x10 each   MB OH Pass 2D 2# x10 each                     TB ER Green 3x10 (HEP)   TB Horiz Abd Red 3x10 (HEP) scapular winging as she fatigues   Tb No Money Green 3x10 (HEP)                                                               Home Exercise Program: See above     Patient Education:    Reviewed gradual return to sport progressions and guidelines. Discussed good vs bad sore and need to do listed HEP regularly. Pt voiced good understanding.        Assessment:  [x] Patient tolerated treatment well [] Patient limited by fatigue  [] Patient limited by pain  [] Patient limited by other medical complications  [] Other:     Prognosis: [x] Good [] Fair  [] Poor    Patient Requires Follow-up: [] Yes  [] No    Plan:   [x] Continue per plan of care [] Alter current plan (see comments)  [x] Plan of care initiated [] Hold pending MD visit [] Discharge    Electronically signed by:  Katie Duldey, ATR,ATC, CSCS     Tx was performed by ATC as a part of the [employer] program following PT's recommendations/guidance.        Cosigned by:

## 2023-08-21 ENCOUNTER — HOSPITAL ENCOUNTER (OUTPATIENT)
Dept: PHYSICAL THERAPY | Age: 33
Setting detail: THERAPIES SERIES
Discharge: HOME | End: 2023-08-21
Payer: COMMERCIAL

## 2023-08-21 PROCEDURE — 97161 PT EVAL LOW COMPLEX 20 MIN: CPT | Performed by: PHYSICAL THERAPIST

## 2023-08-24 NOTE — FLOWSHEET NOTE
[facility] and [address] [place] [address] [address],  Sports Performance and Rehabilitation, [address] [place] [address], [address] [place] [address] Avenue  Phone: [phone]  Fax: [phone]      Physical Therapy Daily Treatment Note  Date:  2023    Patient Name:  lEana Lombardo    :  1990  MRN: [id number]  Restrictions/Precautions:    Medical/Treatment Diagnosis Information:  Pain in left shoulder [P78.055]     Insurance/Certification information:     Physician Information:   Laurita Bradford MD    Has the plan of care been signed (Y/N):        []  Yes  [x]  No     Date of Patient follow up with Physician:       Is this a Progress Report:     []  Yes  [x]  No        If Yes:  Date Range for reporting period:  Beginning  Ending    Progress report will be due (10 Rx or 30 days whichever is less):        Recertification will be due (POC Duration  / 90 days whichever is less):          Visit # Insurance Allowable Auth Required   1  []  Yes []  No        OUTCOME MEASURE DATE SCORE   UEFI 2023 80%            Latex Allergy:  [x]NO      []YES  Preferred Language for Healthcare:   [x]English       []other:    Pain level:  7/10     SUBJECTIVE:  See eval    OBJECTIVE: See eval  Observation:   Test measurements:      RESTRICTIONS/PRECAUTIONS:     Exercises/Interventions:   Exercise/Equipment Resistance/Repetitions Other comments   Stretching/PROM     Wand     Table Slides     UE [place]     Pulleys     Pendulum     Sleeper stretch     Wall  5 x 20\"     4 x 10\"     Isometrics     Retraction          Weight shift     Flexion     Abduction     External Rotation     Internal Rotation     Biceps     Triceps          PRE's     Flexion     Abduction     External Rotation     Internal Rotation     Shrugs     EXT     Reverse Flys     Serratus     Horizontal Abd with ER     Biceps     Triceps     Retraction     S ly er     Daniel 4        17'     Cable Column/Theraband     External

## 2023-08-31 ENCOUNTER — HOSPITAL ENCOUNTER (OUTPATIENT)
Dept: PHYSICAL THERAPY | Age: 33
Setting detail: THERAPIES SERIES
Discharge: HOME | End: 2023-08-31
Payer: COMMERCIAL

## 2023-08-31 PROCEDURE — 97112 NEUROMUSCULAR REEDUCATION: CPT | Performed by: PHYSICAL THERAPIST

## 2023-09-07 ENCOUNTER — APPOINTMENT (OUTPATIENT)
Dept: PHYSICAL THERAPY | Age: 33
End: 2023-09-07
Payer: COMMERCIAL

## 2023-09-14 ENCOUNTER — HOSPITAL ENCOUNTER (OUTPATIENT)
Dept: PHYSICAL THERAPY | Age: 33
Setting detail: THERAPIES SERIES
Discharge: HOME | End: 2023-09-14
Payer: COMMERCIAL

## 2023-09-14 PROCEDURE — 97112 NEUROMUSCULAR REEDUCATION: CPT | Performed by: PHYSICAL THERAPIST

## 2023-09-21 ENCOUNTER — HOSPITAL ENCOUNTER (OUTPATIENT)
Dept: PHYSICAL THERAPY | Age: 33
Setting detail: THERAPIES SERIES
Discharge: HOME | End: 2023-09-21
Payer: COMMERCIAL

## 2023-09-21 PROCEDURE — 97112 NEUROMUSCULAR REEDUCATION: CPT | Performed by: PHYSICAL THERAPIST

## 2023-09-29 ENCOUNTER — HOSPITAL ENCOUNTER (OUTPATIENT)
Dept: PHYSICAL THERAPY | Age: 33
Setting detail: THERAPIES SERIES
Discharge: HOME | End: 2023-09-29
Payer: COMMERCIAL

## 2023-09-29 PROCEDURE — 97112 NEUROMUSCULAR REEDUCATION: CPT | Performed by: PHYSICAL THERAPIST

## 2023-10-05 ENCOUNTER — HOSPITAL ENCOUNTER (OUTPATIENT)
Dept: PHYSICAL THERAPY | Age: 33
Setting detail: THERAPIES SERIES
Discharge: HOME | End: 2023-10-05
Payer: COMMERCIAL

## 2023-10-05 PROCEDURE — 97112 NEUROMUSCULAR REEDUCATION: CPT | Performed by: PHYSICAL THERAPIST

## 2023-10-07 NOTE — FLOWSHEET NOTE
Patient is progressing as expected towards functional goals listed. [] Progression is slowed due to complexities/Impairments listed. [] Progression has been slowed due to co-morbidities. [x] Plan just implemented, too soon to assess goals progression <30days   [] Goals require adjustment due to lack of progress  [] Patient is not progressing as expected and requires additional follow up with physician  [] Other    Prognosis for POC: [x] Good [] Fair  [] Poor      Patient requires continued skilled intervention: [x] Yes  [] No    Treatment/Activity Tolerance:  [x] Patient able to complete treatment  [] Patient limited by fatigue  [] Patient limited by pain     [] Patient limited by other medical complications  [] Other:                         PLAN: See eval  [x] Continue per plan of care [] Alter current plan (see comments above)  [] Plan of care initiated [] Hold pending MD visit [] Discharge      Electronically signed by:  Bianca Jean PT, MPT     Note: If patient does not return for scheduled/ recommended follow up visits, this note will serve as a discharge from care along with most recent update on progress.

## 2023-10-12 ENCOUNTER — APPOINTMENT (OUTPATIENT)
Dept: PHYSICAL THERAPY | Age: 33
End: 2023-10-12
Payer: COMMERCIAL

## 2023-10-19 ENCOUNTER — HOSPITAL ENCOUNTER (OUTPATIENT)
Dept: PHYSICAL THERAPY | Age: 33
Setting detail: THERAPIES SERIES
Discharge: HOME | End: 2023-10-19
Payer: COMMERCIAL

## 2023-10-26 ENCOUNTER — HOSPITAL ENCOUNTER (OUTPATIENT)
Dept: PHYSICAL THERAPY | Age: 33
Setting detail: THERAPIES SERIES
Discharge: HOME | End: 2023-10-26
Payer: COMMERCIAL

## 2023-10-26 PROCEDURE — 97112 NEUROMUSCULAR REEDUCATION: CPT | Performed by: PHYSICAL THERAPIST

## 2023-10-29 NOTE — FLOWSHEET NOTE
Triceps     Retraction     S ly er   I/y/t's, 90/90     Wall lift offs     Punches     T bands     Scaptions       CC: Rows, pull downs, robbers       Wbing progression    Wall slides         25x - MR   25x - each     20x     25x mr    25x red ir; 25x green er     25x 1#      30x each        10'    2'    Cable Column/Theraband     External Rotation     Internal Rotation     Shrugs     Lats     Ext     Flex     Scapular Retraction     BIC     TRIC     PNF          Dynamic Stability          Plyoback          Manual interventions     Prom/stm  25'                Therapeutic Exercise and NMR EXR  [x] (97851) Provided verbal/tactile cueing for activities related to strengthening, flexibility, endurance, ROM  for improvements in scapular, scapulothoracic and UE control with self care, reaching, carrying, lifting, house/yardwork, driving/computer work.    [] (69578) Provided verbal/tactile cueing for activities related to improving balance, coordination, kinesthetic sense, posture, motor skill, proprioception  to assist with  scapular, scapulothoracic and UE control with self care, reaching, carrying, lifting, house/yardwork, driving/computer work. Therapeutic Activities:    [x] (93709 or 85549) Provided verbal/tactile cueing for activities related to improving balance, coordination, kinesthetic sense, posture, motor skill, proprioception and motor activation to allow for proper function of scapular, scapulothoracic and UE control with self care, carrying, lifting, driving/computer work.      Home Exercise Program:    [x] (41374) Reviewed/Progressed HEP activities related to strengthening, flexibility, endurance, ROM of scapular, scapulothoracic and UE control with self care, reaching, carrying, lifting, house/yardwork, driving/computer work  [] (83045) Reviewed/Progressed HEP activities related to improving balance, coordination, kinesthetic sense, posture, motor skill, proprioception of scapular, scapulothoracic and

## 2023-11-02 ENCOUNTER — HOSPITAL ENCOUNTER (OUTPATIENT)
Dept: PHYSICAL THERAPY | Age: 33
Setting detail: THERAPIES SERIES
End: 2023-11-02
Payer: COMMERCIAL

## 2023-11-09 ENCOUNTER — HOSPITAL ENCOUNTER (OUTPATIENT)
Dept: PHYSICAL THERAPY | Age: 33
Setting detail: THERAPIES SERIES
Discharge: HOME | End: 2023-11-09
Payer: COMMERCIAL

## 2023-11-09 PROCEDURE — 97112 NEUROMUSCULAR REEDUCATION: CPT | Performed by: PHYSICAL THERAPIST

## 2023-11-16 ENCOUNTER — HOSPITAL ENCOUNTER (OUTPATIENT)
Dept: PHYSICAL THERAPY | Age: 33
Setting detail: THERAPIES SERIES
Discharge: HOME | End: 2023-11-16
Payer: COMMERCIAL

## 2023-11-16 PROCEDURE — 97112 NEUROMUSCULAR REEDUCATION: CPT | Performed by: PHYSICAL THERAPIST

## 2023-11-29 ENCOUNTER — APPOINTMENT (OUTPATIENT)
Dept: PHYSICAL THERAPY | Age: 33
End: 2023-11-29
Payer: COMMERCIAL

## 2023-11-30 ENCOUNTER — APPOINTMENT (OUTPATIENT)
Dept: PHYSICAL THERAPY | Age: 33
End: 2023-11-30
Payer: COMMERCIAL

## 2023-12-06 ENCOUNTER — HOSPITAL ENCOUNTER (OUTPATIENT)
Dept: PHYSICAL THERAPY | Age: 33
Setting detail: THERAPIES SERIES
Discharge: HOME | End: 2023-12-06
Payer: COMMERCIAL

## 2023-12-06 PROCEDURE — 97110 THERAPEUTIC EXERCISES: CPT | Performed by: PHYSICAL THERAPIST

## 2025-07-23 NOTE — PROGRESS NOTES
[facility]  DIVISION OF OTOLARYNGOLOGY- HEAD & NECK SURGERY  CONSULT    Patient Name: Amna Thorne  Medical Record Number:  [id number]  Primary Care Physician:  Jules Agee MD  Date of Consultation: 7/25/2025    Chief Complaint:   Chief Complaint   Patient presents with    New Patient    Ear Problem     Chronic sinus drainage and left nostril feels restricted. She has also had ear pressure and has to \"pop \" her ears frequently. Symptoms for 10 years at least.  Saw Dr. Horner at [facility] and recommended surgery for deviated septum.      HISTORY OF PRESENT ILLNESS  Amna is a(n) 35 y.o. female who presents for evaluation of nasal congestion.  She has tried Flonase without significant improvement.  She also has left sided ear popping and cracking.  She does this approximately 20 times a day.  Patient Active Problem List   Diagnosis    Asthma    BRIAN (generalised anxiety disorder)    Interstitial cystitis     Past Surgical History:   Procedure Laterality Date    ARTHROSCOPY / ARTHROTOMY KNEE Left 12/14/2018    LEFT KNEE VIDEO ARTHROSCOPY, MEDIAL  MENISCUS REPAIR performed by Denver Thomas [place], MD at [facility] OR    CYSTOSCOPY      TONSILLECTOMY       Family History   Problem Relation Age of Onset    Cancer Father         pancreatic -2010    Cancer Paternal Grandmother         lung Cancer     Social History     Socioeconomic History    Marital status:      Spouse name: Not on file    Number of children: Not on file    Years of education: Not on file    Highest education level: Not on file   Occupational History    Not on file   Tobacco Use    Smoking status: Never    Smokeless tobacco: Never   Vaping Use    Vaping status: Never Used   Substance and Sexual Activity    Alcohol use: No    Drug use: No    Sexual activity: Yes     Partners: Male   Other Topics Concern    Not on file   Social History Narrative    Not on file     Social Drivers of Health     Financial Resource Strain: Not on file  no

## 2025-07-25 ENCOUNTER — OFFICE VISIT (OUTPATIENT)
Dept: ENT CLINIC | Age: 35
End: 2025-07-25

## 2025-07-25 VITALS
SYSTOLIC BLOOD PRESSURE: 113 MMHG | WEIGHT: 158 LBS | HEART RATE: 64 BPM | BODY MASS INDEX: 24.75 KG/M2 | DIASTOLIC BLOOD PRESSURE: 73 MMHG

## 2025-07-25 DIAGNOSIS — J34.2 DEVIATED NASAL SEPTUM: Primary | ICD-10-CM

## 2025-07-25 DIAGNOSIS — J34.3 HYPERTROPHY OF INFERIOR NASAL TURBINATE: ICD-10-CM

## 2025-07-25 ASSESSMENT — ENCOUNTER SYMPTOMS
RHINORRHEA: 0
NAUSEA: 0
VOMITING: 0
SHORTNESS OF BREATH: 0
EYE PAIN: 0
COUGH: 0

## 2025-07-25 NOTE — PATIENT INSTRUCTIONS
SEPTOPLASTY / TURBINATE REDUCTION     Post Operative Instructions   [facility] Number (24 hours): [phone]    The Surgery Itself   Septoplasty and turbinate reduction involves general anesthesia, typically for less than one hour. Patients may be sedated for several hours after surgery and may remain sleepy for the better part of the day. Nausea and vomiting is occasionally seen, and usually resolves by the evening of surgery - even without additional medications. Almost all patients can go home the day of surgery.     After Surgery  You may have plastic splints and/or gauze in your nose following surgery; this will make breathing through your nose difficult. A humidifier or vaporizer can be used in the bedroom to prevent throat pain with mouth breathing.   Bloody nasal drainage is normal after this surgery for 5-7 days, usually decreasing in volume with each day that passes. Drainage will flow from the front of the nose and down the back of the throat. Make sure you spit out blood drainage that drips down the back of your throat to prevent nausea/vomiting. You will have a nasal drip pad/sling with gauze to catch drainage from the front of your nose. The dressing may need to be changed frequently during the first 24 hours following surgery. In case of profuse nasal bleeding, you may apply ice to the bridge of the nose and pinch the nose just above the tip and hold for 10 minutes; if profuse bleeding continues, contact the doctor's office.   You may notice facial pressure and fullness similar to a mild sinus infection. This is more common if splints or gauze are in place.   Frequent hot showers, breathing in steam from a pot of boiling water, or simply sniffing a small amount of water through your nose will help break up congestion and clear any clot or mucus that builds up within the nose after surgery. Do not pull at the splints, gauze or sutures in your nose after surgery.    It is more comfortable to sleep

## (undated) DEVICE — CANNULA THREADED FLEX 8.0 X 72MM: Brand: CLEAR-TRAC

## (undated) DEVICE — TUBING PMP L16FT MAIN DISP FOR AR-6400 AR-6475

## (undated) DEVICE — SUTURE FIBERWIRE SZ 0 L38IN NONABSORBABLE BLU L22.2MM 1/2 AR7250

## (undated) DEVICE — WEREWOLF FLOW 50 COBLATION WAND: Brand: COBLATION

## (undated) DEVICE — GLOVE SURG SZ 85 L12IN FNGR THK13MIL WHT ISOLEX POLYISOPRENE

## (undated) DEVICE — TUBING PMP L6FT CONT WAVE EXTN

## (undated) DEVICE — SUTURE NONABSORBABLE MONOFILAMENT 3-0 PS-1 18 IN BLK ETHILON 1663H

## (undated) DEVICE — GOWN,SIRUS,POLYRNF,BRTHSLV,LG,30/CS: Brand: MEDLINE

## (undated) DEVICE — 3.5 MM FULL RADIUS STRAIGHT                                    BLADES, POWER/EP-1, BEIGE, PACKAGED                                    6 PER BOX, STERILE

## (undated) DEVICE — PACK PROCEDURE SURG SURGERYARTHROSCOPY KNEE

## (undated) DEVICE — PASSER SUT 25DEG L CRV TIGHT NIT WIRE LOOP 2 FIBERSTICK

## (undated) DEVICE — FAST-FIX 360 STRAIGHT KNOT                                    PUSHER/CUTTER AND SLOTTED CANNULA SETS: Brand: FAST-FIX

## (undated) DEVICE — STERILE LATEX POWDER-FREE SURGICAL GLOVESWITH NITRILE COATING: Brand: PROTEXIS

## (undated) DEVICE — BANDAGE ELASTIC 6 IN

## (undated) DEVICE — GLOVE NON LATEX  SIZE 8.0

## (undated) DEVICE — PADDING CAST W4INXL4YD NONSTERILE COT RAYON MICROPLEATED

## (undated) DEVICE — Z DUP USE 2635506 SODIUM CHL 0.9% IRRIG